# Patient Record
Sex: FEMALE | Race: WHITE | Employment: FULL TIME | URBAN - NONMETROPOLITAN AREA
[De-identification: names, ages, dates, MRNs, and addresses within clinical notes are randomized per-mention and may not be internally consistent; named-entity substitution may affect disease eponyms.]

---

## 2019-01-02 ENCOUNTER — APPOINTMENT (OUTPATIENT)
Dept: CT IMAGING | Age: 59
End: 2019-01-02
Payer: COMMERCIAL

## 2019-01-02 ENCOUNTER — HOSPITAL ENCOUNTER (EMERGENCY)
Age: 59
Discharge: ANOTHER HOSPITAL | End: 2019-01-02
Attending: EMERGENCY MEDICINE
Payer: COMMERCIAL

## 2019-01-02 ENCOUNTER — HOSPITAL ENCOUNTER (OUTPATIENT)
Age: 59
Setting detail: OBSERVATION
Discharge: HOME | End: 2019-01-06
Attending: HOSPITALIST | Admitting: HOSPITALIST
Payer: COMMERCIAL

## 2019-01-02 VITALS
DIASTOLIC BLOOD PRESSURE: 90 MMHG | BODY MASS INDEX: 30.61 KG/M2 | SYSTOLIC BLOOD PRESSURE: 153 MMHG | HEART RATE: 59 BPM | OXYGEN SATURATION: 98 % | RESPIRATION RATE: 15 BRPM | TEMPERATURE: 98.5 F | WEIGHT: 195 LBS | HEIGHT: 67 IN

## 2019-01-02 DIAGNOSIS — R26.9 IMPAIRED GAIT: ICD-10-CM

## 2019-01-02 DIAGNOSIS — R29.818 IMPAIRED PROPRIOCEPTION: Primary | ICD-10-CM

## 2019-01-02 DIAGNOSIS — R20.2 PARESTHESIAS: ICD-10-CM

## 2019-01-02 PROBLEM — R29.898 RIGHT LEG WEAKNESS: Status: ACTIVE | Noted: 2019-01-02

## 2019-01-02 PROBLEM — R27.0 ATAXIA: Status: ACTIVE | Noted: 2019-01-02

## 2019-01-02 LAB
ALBUMIN SERPL-MCNC: 4.2 GM/DL (ref 3.4–5)
ALP BLD-CCNC: 74 IU/L (ref 40–129)
ALT SERPL-CCNC: 41 U/L (ref 10–40)
ANION GAP SERPL CALCULATED.3IONS-SCNC: 10 MMOL/L (ref 4–16)
AST SERPL-CCNC: 29 IU/L (ref 15–37)
BASOPHILS ABSOLUTE: 0 K/CU MM
BASOPHILS RELATIVE PERCENT: 0.5 % (ref 0–1)
BILIRUB SERPL-MCNC: 0.4 MG/DL (ref 0–1)
BUN BLDV-MCNC: 14 MG/DL (ref 6–23)
CALCIUM SERPL-MCNC: 8.3 MG/DL (ref 8.3–10.6)
CHLORIDE BLD-SCNC: 102 MMOL/L (ref 99–110)
CO2: 29 MMOL/L (ref 21–32)
CREAT SERPL-MCNC: 0.7 MG/DL (ref 0.6–1.1)
DIFFERENTIAL TYPE: ABNORMAL
EOSINOPHILS ABSOLUTE: 0.1 K/CU MM
EOSINOPHILS RELATIVE PERCENT: 1.7 % (ref 0–3)
GFR AFRICAN AMERICAN: >60 ML/MIN/1.73M2
GFR NON-AFRICAN AMERICAN: >60 ML/MIN/1.73M2
GLUCOSE BLD-MCNC: 92 MG/DL (ref 70–99)
HCT VFR BLD CALC: 41.6 % (ref 37–47)
HEMOGLOBIN: 13.8 GM/DL (ref 12.5–16)
IMMATURE NEUTROPHIL %: 0.4 % (ref 0–0.43)
LYMPHOCYTES ABSOLUTE: 1.4 K/CU MM
LYMPHOCYTES RELATIVE PERCENT: 17.8 % (ref 24–44)
MCH RBC QN AUTO: 30.7 PG (ref 27–31)
MCHC RBC AUTO-ENTMCNC: 33.2 % (ref 32–36)
MCV RBC AUTO: 92.7 FL (ref 78–100)
MONOCYTES ABSOLUTE: 0.6 K/CU MM
MONOCYTES RELATIVE PERCENT: 7.9 % (ref 0–4)
PDW BLD-RTO: 12 % (ref 11.7–14.9)
PLATELET # BLD: 236 K/CU MM (ref 140–440)
PMV BLD AUTO: 10.5 FL (ref 7.5–11.1)
POTASSIUM SERPL-SCNC: 3.9 MMOL/L (ref 3.5–5.1)
RBC # BLD: 4.49 M/CU MM (ref 4.2–5.4)
SEGMENTED NEUTROPHILS ABSOLUTE COUNT: 5.6 K/CU MM
SEGMENTED NEUTROPHILS RELATIVE PERCENT: 71.7 % (ref 36–66)
SODIUM BLD-SCNC: 141 MMOL/L (ref 135–145)
TOTAL CK: 96 IU/L (ref 26–140)
TOTAL IMMATURE NEUTOROPHIL: 0.03 K/CU MM
TOTAL PROTEIN: 6.7 GM/DL (ref 6.4–8.2)
WBC # BLD: 7.8 K/CU MM (ref 4–10.5)

## 2019-01-02 PROCEDURE — 6370000000 HC RX 637 (ALT 250 FOR IP): Performed by: EMERGENCY MEDICINE

## 2019-01-02 PROCEDURE — G0378 HOSPITAL OBSERVATION PER HR: HCPCS

## 2019-01-02 PROCEDURE — 2580000003 HC RX 258: Performed by: NURSE PRACTITIONER

## 2019-01-02 PROCEDURE — 1200000000 HC SEMI PRIVATE

## 2019-01-02 PROCEDURE — 99285 EMERGENCY DEPT VISIT HI MDM: CPT

## 2019-01-02 PROCEDURE — 82550 ASSAY OF CK (CPK): CPT

## 2019-01-02 PROCEDURE — 70450 CT HEAD/BRAIN W/O DYE: CPT

## 2019-01-02 PROCEDURE — 80053 COMPREHEN METABOLIC PANEL: CPT

## 2019-01-02 PROCEDURE — 6370000000 HC RX 637 (ALT 250 FOR IP): Performed by: HOSPITALIST

## 2019-01-02 PROCEDURE — 85025 COMPLETE CBC W/AUTO DIFF WBC: CPT

## 2019-01-02 PROCEDURE — 6370000000 HC RX 637 (ALT 250 FOR IP): Performed by: NURSE PRACTITIONER

## 2019-01-02 RX ORDER — ATORVASTATIN CALCIUM 40 MG/1
40 TABLET, FILM COATED ORAL NIGHTLY
Status: DISCONTINUED | OUTPATIENT
Start: 2019-01-02 | End: 2019-01-06 | Stop reason: HOSPADM

## 2019-01-02 RX ORDER — FAMOTIDINE 20 MG/1
20 TABLET, FILM COATED ORAL 2 TIMES DAILY
Status: DISCONTINUED | OUTPATIENT
Start: 2019-01-02 | End: 2019-01-06 | Stop reason: HOSPADM

## 2019-01-02 RX ORDER — LISINOPRIL 40 MG/1
40 TABLET ORAL DAILY
Status: DISCONTINUED | OUTPATIENT
Start: 2019-01-03 | End: 2019-01-06 | Stop reason: HOSPADM

## 2019-01-02 RX ORDER — ONDANSETRON 2 MG/ML
4 INJECTION INTRAMUSCULAR; INTRAVENOUS EVERY 6 HOURS PRN
Status: DISCONTINUED | OUTPATIENT
Start: 2019-01-02 | End: 2019-01-06 | Stop reason: HOSPADM

## 2019-01-02 RX ORDER — ASPIRIN 81 MG/1
81 TABLET ORAL DAILY
Status: DISCONTINUED | OUTPATIENT
Start: 2019-01-03 | End: 2019-01-02

## 2019-01-02 RX ORDER — HYDROCHLOROTHIAZIDE 25 MG/1
25 TABLET ORAL DAILY
Status: DISCONTINUED | OUTPATIENT
Start: 2019-01-03 | End: 2019-01-06 | Stop reason: HOSPADM

## 2019-01-02 RX ORDER — SODIUM CHLORIDE 0.9 % (FLUSH) 0.9 %
10 SYRINGE (ML) INJECTION PRN
Status: DISCONTINUED | OUTPATIENT
Start: 2019-01-02 | End: 2019-01-06 | Stop reason: HOSPADM

## 2019-01-02 RX ORDER — SODIUM CHLORIDE 0.9 % (FLUSH) 0.9 %
10 SYRINGE (ML) INJECTION EVERY 12 HOURS SCHEDULED
Status: DISCONTINUED | OUTPATIENT
Start: 2019-01-02 | End: 2019-01-06 | Stop reason: HOSPADM

## 2019-01-02 RX ORDER — ESCITALOPRAM OXALATE 10 MG/1
10 TABLET ORAL DAILY
Status: DISCONTINUED | OUTPATIENT
Start: 2019-01-03 | End: 2019-01-06 | Stop reason: HOSPADM

## 2019-01-02 RX ORDER — ACETAMINOPHEN 500 MG
1000 TABLET ORAL ONCE
Status: COMPLETED | OUTPATIENT
Start: 2019-01-02 | End: 2019-01-02

## 2019-01-02 RX ORDER — ASPIRIN 81 MG/1
81 TABLET ORAL DAILY
Status: DISCONTINUED | OUTPATIENT
Start: 2019-01-02 | End: 2019-01-06 | Stop reason: HOSPADM

## 2019-01-02 RX ADMIN — SODIUM CHLORIDE, PRESERVATIVE FREE 10 ML: 5 INJECTION INTRAVENOUS at 23:42

## 2019-01-02 RX ADMIN — FAMOTIDINE 20 MG: 20 TABLET ORAL at 23:42

## 2019-01-02 RX ADMIN — ATORVASTATIN CALCIUM 40 MG: 40 TABLET, FILM COATED ORAL at 23:42

## 2019-01-02 RX ADMIN — ASPIRIN 81 MG: 81 TABLET, COATED ORAL at 23:42

## 2019-01-02 RX ADMIN — ACETAMINOPHEN 1000 MG: 500 TABLET, FILM COATED ORAL at 18:11

## 2019-01-02 ASSESSMENT — ENCOUNTER SYMPTOMS
RHINORRHEA: 0
ABDOMINAL PAIN: 0
VOMITING: 0
BACK PAIN: 0
COUGH: 0
SORE THROAT: 0
NAUSEA: 0
SHORTNESS OF BREATH: 0
EYE REDNESS: 0

## 2019-01-02 ASSESSMENT — PAIN SCALES - GENERAL
PAINLEVEL_OUTOF10: 6
PAINLEVEL_OUTOF10: 0

## 2019-01-03 ENCOUNTER — APPOINTMENT (OUTPATIENT)
Dept: ULTRASOUND IMAGING | Age: 59
End: 2019-01-03
Attending: HOSPITALIST
Payer: COMMERCIAL

## 2019-01-03 ENCOUNTER — APPOINTMENT (OUTPATIENT)
Dept: MRI IMAGING | Age: 59
End: 2019-01-03
Attending: HOSPITALIST
Payer: COMMERCIAL

## 2019-01-03 LAB
CHOLESTEROL: 260 MG/DL
HDLC SERPL-MCNC: 56 MG/DL
HOMOCYSTEINE: 10 UMOL/L (ref 0–10)
LDL CHOLESTEROL DIRECT: 204 MG/DL
LV EF: 55 %
LVEF MODALITY: NORMAL
TRIGL SERPL-MCNC: 100 MG/DL

## 2019-01-03 PROCEDURE — G8988 SELF CARE GOAL STATUS: HCPCS

## 2019-01-03 PROCEDURE — 36415 COLL VENOUS BLD VENIPUNCTURE: CPT

## 2019-01-03 PROCEDURE — 97116 GAIT TRAINING THERAPY: CPT

## 2019-01-03 PROCEDURE — 6360000002 HC RX W HCPCS: Performed by: NURSE PRACTITIONER

## 2019-01-03 PROCEDURE — 83721 ASSAY OF BLOOD LIPOPROTEIN: CPT

## 2019-01-03 PROCEDURE — 97166 OT EVAL MOD COMPLEX 45 MIN: CPT

## 2019-01-03 PROCEDURE — 97162 PT EVAL MOD COMPLEX 30 MIN: CPT

## 2019-01-03 PROCEDURE — 80061 LIPID PANEL: CPT

## 2019-01-03 PROCEDURE — G0378 HOSPITAL OBSERVATION PER HR: HCPCS

## 2019-01-03 PROCEDURE — 1200000000 HC SEMI PRIVATE

## 2019-01-03 PROCEDURE — 97530 THERAPEUTIC ACTIVITIES: CPT

## 2019-01-03 PROCEDURE — 83090 ASSAY OF HOMOCYSTEINE: CPT

## 2019-01-03 PROCEDURE — 2580000003 HC RX 258: Performed by: NURSE PRACTITIONER

## 2019-01-03 PROCEDURE — 93306 TTE W/DOPPLER COMPLETE: CPT

## 2019-01-03 PROCEDURE — 70551 MRI BRAIN STEM W/O DYE: CPT

## 2019-01-03 PROCEDURE — 93880 EXTRACRANIAL BILAT STUDY: CPT

## 2019-01-03 PROCEDURE — G8987 SELF CARE CURRENT STATUS: HCPCS

## 2019-01-03 PROCEDURE — 6370000000 HC RX 637 (ALT 250 FOR IP): Performed by: NURSE PRACTITIONER

## 2019-01-03 PROCEDURE — 6370000000 HC RX 637 (ALT 250 FOR IP): Performed by: HOSPITALIST

## 2019-01-03 RX ORDER — TRAMADOL HYDROCHLORIDE 50 MG/1
50 TABLET ORAL EVERY 6 HOURS PRN
Status: DISCONTINUED | OUTPATIENT
Start: 2019-01-03 | End: 2019-01-06 | Stop reason: HOSPADM

## 2019-01-03 RX ADMIN — TRAMADOL HYDROCHLORIDE 50 MG: 50 TABLET, FILM COATED ORAL at 16:10

## 2019-01-03 RX ADMIN — ATORVASTATIN CALCIUM 40 MG: 40 TABLET, FILM COATED ORAL at 20:38

## 2019-01-03 RX ADMIN — FAMOTIDINE 20 MG: 20 TABLET ORAL at 20:38

## 2019-01-03 RX ADMIN — HYDROCHLOROTHIAZIDE 25 MG: 25 TABLET ORAL at 11:07

## 2019-01-03 RX ADMIN — SODIUM CHLORIDE, PRESERVATIVE FREE 10 ML: 5 INJECTION INTRAVENOUS at 20:38

## 2019-01-03 RX ADMIN — ESCITALOPRAM OXALATE 10 MG: 10 TABLET ORAL at 11:07

## 2019-01-03 RX ADMIN — FAMOTIDINE 20 MG: 20 TABLET ORAL at 11:07

## 2019-01-03 RX ADMIN — SODIUM CHLORIDE, PRESERVATIVE FREE 10 ML: 5 INJECTION INTRAVENOUS at 11:08

## 2019-01-03 RX ADMIN — LISINOPRIL 40 MG: 40 TABLET ORAL at 11:07

## 2019-01-03 RX ADMIN — ENOXAPARIN SODIUM 40 MG: 40 INJECTION SUBCUTANEOUS at 11:07

## 2019-01-03 RX ADMIN — ASPIRIN 81 MG: 81 TABLET, COATED ORAL at 11:07

## 2019-01-03 ASSESSMENT — PAIN SCALES - GENERAL
PAINLEVEL_OUTOF10: 7
PAINLEVEL_OUTOF10: 5
PAINLEVEL_OUTOF10: 5
PAINLEVEL_OUTOF10: 0
PAINLEVEL_OUTOF10: 4
PAINLEVEL_OUTOF10: 3

## 2019-01-03 ASSESSMENT — PAIN DESCRIPTION - LOCATION
LOCATION: FLANK
LOCATION: FLANK

## 2019-01-03 ASSESSMENT — PAIN DESCRIPTION - ONSET: ONSET: UNABLE TO TELL

## 2019-01-03 ASSESSMENT — PAIN DESCRIPTION - DESCRIPTORS: DESCRIPTORS: BURNING;TENDER;SORE

## 2019-01-03 ASSESSMENT — PAIN DESCRIPTION - FREQUENCY: FREQUENCY: INTERMITTENT

## 2019-01-03 ASSESSMENT — PAIN DESCRIPTION - ORIENTATION
ORIENTATION: RIGHT
ORIENTATION: RIGHT

## 2019-01-03 ASSESSMENT — PAIN DESCRIPTION - PAIN TYPE
TYPE: ACUTE PAIN
TYPE: ACUTE PAIN

## 2019-01-04 PROCEDURE — 6370000000 HC RX 637 (ALT 250 FOR IP): Performed by: HOSPITALIST

## 2019-01-04 PROCEDURE — 6370000000 HC RX 637 (ALT 250 FOR IP): Performed by: NURSE PRACTITIONER

## 2019-01-04 PROCEDURE — G0378 HOSPITAL OBSERVATION PER HR: HCPCS

## 2019-01-04 PROCEDURE — 6360000002 HC RX W HCPCS: Performed by: NURSE PRACTITIONER

## 2019-01-04 PROCEDURE — 96374 THER/PROPH/DIAG INJ IV PUSH: CPT

## 2019-01-04 PROCEDURE — 6360000002 HC RX W HCPCS: Performed by: HOSPITALIST

## 2019-01-04 PROCEDURE — 2580000003 HC RX 258: Performed by: NURSE PRACTITIONER

## 2019-01-04 PROCEDURE — 97116 GAIT TRAINING THERAPY: CPT

## 2019-01-04 RX ORDER — KETOROLAC TROMETHAMINE 30 MG/ML
15 INJECTION, SOLUTION INTRAMUSCULAR; INTRAVENOUS ONCE
Status: COMPLETED | OUTPATIENT
Start: 2019-01-04 | End: 2019-01-04

## 2019-01-04 RX ADMIN — FAMOTIDINE 20 MG: 20 TABLET ORAL at 21:56

## 2019-01-04 RX ADMIN — ENOXAPARIN SODIUM 40 MG: 40 INJECTION SUBCUTANEOUS at 09:07

## 2019-01-04 RX ADMIN — SODIUM CHLORIDE, PRESERVATIVE FREE 10 ML: 5 INJECTION INTRAVENOUS at 21:56

## 2019-01-04 RX ADMIN — KETOROLAC TROMETHAMINE 15 MG: 30 INJECTION, SOLUTION INTRAMUSCULAR at 11:43

## 2019-01-04 RX ADMIN — LISINOPRIL 40 MG: 40 TABLET ORAL at 09:07

## 2019-01-04 RX ADMIN — ASPIRIN 81 MG: 81 TABLET, COATED ORAL at 09:07

## 2019-01-04 RX ADMIN — SODIUM CHLORIDE, PRESERVATIVE FREE 10 ML: 5 INJECTION INTRAVENOUS at 09:09

## 2019-01-04 RX ADMIN — FAMOTIDINE 20 MG: 20 TABLET ORAL at 09:07

## 2019-01-04 RX ADMIN — ESCITALOPRAM OXALATE 10 MG: 10 TABLET ORAL at 09:07

## 2019-01-04 RX ADMIN — HYDROCHLOROTHIAZIDE 25 MG: 25 TABLET ORAL at 09:07

## 2019-01-04 RX ADMIN — ATORVASTATIN CALCIUM 40 MG: 40 TABLET, FILM COATED ORAL at 21:56

## 2019-01-04 RX ADMIN — TRAMADOL HYDROCHLORIDE 50 MG: 50 TABLET, FILM COATED ORAL at 22:40

## 2019-01-04 ASSESSMENT — PAIN DESCRIPTION - PAIN TYPE: TYPE: ACUTE PAIN

## 2019-01-04 ASSESSMENT — PAIN SCALES - GENERAL
PAINLEVEL_OUTOF10: 5
PAINLEVEL_OUTOF10: 6

## 2019-01-04 ASSESSMENT — PAIN DESCRIPTION - PROGRESSION: CLINICAL_PROGRESSION: NOT CHANGED

## 2019-01-04 ASSESSMENT — PAIN DESCRIPTION - ORIENTATION: ORIENTATION: RIGHT

## 2019-01-04 ASSESSMENT — PAIN DESCRIPTION - FREQUENCY: FREQUENCY: INTERMITTENT

## 2019-01-04 ASSESSMENT — PAIN DESCRIPTION - DESCRIPTORS: DESCRIPTORS: BURNING;TENDER;SORE

## 2019-01-04 ASSESSMENT — PAIN DESCRIPTION - LOCATION: LOCATION: FLANK

## 2019-01-04 ASSESSMENT — PAIN DESCRIPTION - ONSET: ONSET: ON-GOING

## 2019-01-05 ENCOUNTER — APPOINTMENT (OUTPATIENT)
Dept: MRI IMAGING | Age: 59
End: 2019-01-05
Attending: HOSPITALIST
Payer: COMMERCIAL

## 2019-01-05 LAB
FOLATE: 14.2 NG/ML (ref 3.1–17.5)
TSH HIGH SENSITIVITY: 1.8 UIU/ML (ref 0.27–4.2)
VITAMIN B-12: 493.4 PG/ML (ref 211–911)

## 2019-01-05 PROCEDURE — 6370000000 HC RX 637 (ALT 250 FOR IP): Performed by: PSYCHIATRY & NEUROLOGY

## 2019-01-05 PROCEDURE — 72141 MRI NECK SPINE W/O DYE: CPT

## 2019-01-05 PROCEDURE — G0378 HOSPITAL OBSERVATION PER HR: HCPCS

## 2019-01-05 PROCEDURE — 84443 ASSAY THYROID STIM HORMONE: CPT

## 2019-01-05 PROCEDURE — 72146 MRI CHEST SPINE W/O DYE: CPT

## 2019-01-05 PROCEDURE — 6360000002 HC RX W HCPCS: Performed by: NURSE PRACTITIONER

## 2019-01-05 PROCEDURE — 86038 ANTINUCLEAR ANTIBODIES: CPT

## 2019-01-05 PROCEDURE — 97116 GAIT TRAINING THERAPY: CPT

## 2019-01-05 PROCEDURE — 6370000000 HC RX 637 (ALT 250 FOR IP): Performed by: HOSPITALIST

## 2019-01-05 PROCEDURE — 36415 COLL VENOUS BLD VENIPUNCTURE: CPT

## 2019-01-05 PROCEDURE — 2580000003 HC RX 258: Performed by: NURSE PRACTITIONER

## 2019-01-05 PROCEDURE — 97530 THERAPEUTIC ACTIVITIES: CPT

## 2019-01-05 PROCEDURE — 97110 THERAPEUTIC EXERCISES: CPT

## 2019-01-05 PROCEDURE — 82607 VITAMIN B-12: CPT

## 2019-01-05 PROCEDURE — 72148 MRI LUMBAR SPINE W/O DYE: CPT

## 2019-01-05 PROCEDURE — 82746 ASSAY OF FOLIC ACID SERUM: CPT

## 2019-01-05 PROCEDURE — 6370000000 HC RX 637 (ALT 250 FOR IP): Performed by: NURSE PRACTITIONER

## 2019-01-05 RX ORDER — GABAPENTIN 300 MG/1
300 CAPSULE ORAL 2 TIMES DAILY
Status: DISCONTINUED | OUTPATIENT
Start: 2019-01-05 | End: 2019-01-06 | Stop reason: HOSPADM

## 2019-01-05 RX ORDER — B12/LEVOMEFOLATE CALCIUM/B-6 2-1.13-25
1 TABLET ORAL DAILY
Status: DISCONTINUED | OUTPATIENT
Start: 2019-01-06 | End: 2019-01-06 | Stop reason: HOSPADM

## 2019-01-05 RX ADMIN — ESCITALOPRAM OXALATE 10 MG: 10 TABLET ORAL at 08:37

## 2019-01-05 RX ADMIN — FAMOTIDINE 20 MG: 20 TABLET ORAL at 21:02

## 2019-01-05 RX ADMIN — ENOXAPARIN SODIUM 40 MG: 40 INJECTION SUBCUTANEOUS at 08:38

## 2019-01-05 RX ADMIN — SODIUM CHLORIDE, PRESERVATIVE FREE 10 ML: 5 INJECTION INTRAVENOUS at 21:02

## 2019-01-05 RX ADMIN — HYDROCHLOROTHIAZIDE 25 MG: 25 TABLET ORAL at 08:38

## 2019-01-05 RX ADMIN — TRAMADOL HYDROCHLORIDE 50 MG: 50 TABLET, FILM COATED ORAL at 21:05

## 2019-01-05 RX ADMIN — GABAPENTIN 300 MG: 300 CAPSULE ORAL at 22:34

## 2019-01-05 RX ADMIN — SODIUM CHLORIDE, PRESERVATIVE FREE 10 ML: 5 INJECTION INTRAVENOUS at 08:38

## 2019-01-05 RX ADMIN — FAMOTIDINE 20 MG: 20 TABLET ORAL at 08:37

## 2019-01-05 RX ADMIN — ASPIRIN 81 MG: 81 TABLET, COATED ORAL at 08:37

## 2019-01-05 RX ADMIN — LISINOPRIL 40 MG: 40 TABLET ORAL at 08:38

## 2019-01-05 RX ADMIN — ATORVASTATIN CALCIUM 40 MG: 40 TABLET, FILM COATED ORAL at 21:02

## 2019-01-05 ASSESSMENT — PAIN DESCRIPTION - FREQUENCY: FREQUENCY: INTERMITTENT

## 2019-01-05 ASSESSMENT — PAIN DESCRIPTION - LOCATION
LOCATION: ABDOMEN
LOCATION: FLANK

## 2019-01-05 ASSESSMENT — PAIN SCALES - GENERAL
PAINLEVEL_OUTOF10: 6
PAINLEVEL_OUTOF10: 2
PAINLEVEL_OUTOF10: 6
PAINLEVEL_OUTOF10: 4

## 2019-01-05 ASSESSMENT — PAIN DESCRIPTION - PROGRESSION
CLINICAL_PROGRESSION: NOT CHANGED

## 2019-01-05 ASSESSMENT — PAIN DESCRIPTION - ONSET: ONSET: ON-GOING

## 2019-01-05 ASSESSMENT — PAIN DESCRIPTION - DESCRIPTORS: DESCRIPTORS: ACHING

## 2019-01-05 ASSESSMENT — PAIN DESCRIPTION - PAIN TYPE
TYPE: ACUTE PAIN
TYPE: ACUTE PAIN

## 2019-01-05 ASSESSMENT — PAIN DESCRIPTION - ORIENTATION
ORIENTATION: RIGHT
ORIENTATION: RIGHT

## 2019-01-06 ENCOUNTER — APPOINTMENT (OUTPATIENT)
Dept: MRI IMAGING | Age: 59
End: 2019-01-06
Attending: HOSPITALIST
Payer: COMMERCIAL

## 2019-01-06 VITALS
BODY MASS INDEX: 30.92 KG/M2 | OXYGEN SATURATION: 96 % | HEART RATE: 69 BPM | SYSTOLIC BLOOD PRESSURE: 109 MMHG | TEMPERATURE: 97.9 F | WEIGHT: 197 LBS | DIASTOLIC BLOOD PRESSURE: 61 MMHG | RESPIRATION RATE: 16 BRPM | HEIGHT: 67 IN

## 2019-01-06 LAB — ANTI-NUCLEAR ANTIBODY (ANA): NORMAL

## 2019-01-06 PROCEDURE — 6370000000 HC RX 637 (ALT 250 FOR IP): Performed by: PSYCHIATRY & NEUROLOGY

## 2019-01-06 PROCEDURE — 2580000003 HC RX 258: Performed by: NURSE PRACTITIONER

## 2019-01-06 PROCEDURE — 6360000004 HC RX CONTRAST MEDICATION: Performed by: PSYCHIATRY & NEUROLOGY

## 2019-01-06 PROCEDURE — 94761 N-INVAS EAR/PLS OXIMETRY MLT: CPT

## 2019-01-06 PROCEDURE — G0378 HOSPITAL OBSERVATION PER HR: HCPCS

## 2019-01-06 PROCEDURE — 72142 MRI NECK SPINE W/DYE: CPT

## 2019-01-06 PROCEDURE — 6370000000 HC RX 637 (ALT 250 FOR IP): Performed by: NURSE PRACTITIONER

## 2019-01-06 PROCEDURE — 6370000000 HC RX 637 (ALT 250 FOR IP): Performed by: HOSPITALIST

## 2019-01-06 PROCEDURE — A9579 GAD-BASE MR CONTRAST NOS,1ML: HCPCS | Performed by: PSYCHIATRY & NEUROLOGY

## 2019-01-06 PROCEDURE — 6360000002 HC RX W HCPCS: Performed by: NURSE PRACTITIONER

## 2019-01-06 RX ORDER — ASPIRIN 81 MG/1
81 TABLET ORAL DAILY
Qty: 30 TABLET | Refills: 0 | Status: SHIPPED | OUTPATIENT
Start: 2019-01-07

## 2019-01-06 RX ORDER — ATORVASTATIN CALCIUM 40 MG/1
40 TABLET, FILM COATED ORAL NIGHTLY
Qty: 30 TABLET | Refills: 0 | Status: SHIPPED | OUTPATIENT
Start: 2019-01-06 | End: 2021-09-22

## 2019-01-06 RX ORDER — FAMOTIDINE 20 MG/1
20 TABLET, FILM COATED ORAL 2 TIMES DAILY
Qty: 60 TABLET | Refills: 0 | Status: SHIPPED | OUTPATIENT
Start: 2019-01-06 | End: 2021-09-22 | Stop reason: SDUPTHER

## 2019-01-06 RX ORDER — GABAPENTIN 300 MG/1
300 CAPSULE ORAL 2 TIMES DAILY
Qty: 60 CAPSULE | Refills: 0 | Status: SHIPPED | OUTPATIENT
Start: 2019-01-06 | End: 2021-05-26

## 2019-01-06 RX ORDER — B12/LEVOMEFOLATE CALCIUM/B-6 2-1.13-25
1 TABLET ORAL DAILY
Qty: 30 TABLET | Refills: 0 | Status: SHIPPED | OUTPATIENT
Start: 2019-01-07 | End: 2019-05-24

## 2019-01-06 RX ADMIN — Medication 1 TABLET: at 08:24

## 2019-01-06 RX ADMIN — ASPIRIN 81 MG: 81 TABLET, COATED ORAL at 08:24

## 2019-01-06 RX ADMIN — GABAPENTIN 300 MG: 300 CAPSULE ORAL at 08:24

## 2019-01-06 RX ADMIN — GADOTERIDOL 20 ML: 279.3 INJECTION, SOLUTION INTRAVENOUS at 07:43

## 2019-01-06 RX ADMIN — FAMOTIDINE 20 MG: 20 TABLET ORAL at 08:24

## 2019-01-06 RX ADMIN — ENOXAPARIN SODIUM 40 MG: 40 INJECTION SUBCUTANEOUS at 08:25

## 2019-01-06 RX ADMIN — ESCITALOPRAM OXALATE 10 MG: 10 TABLET ORAL at 08:24

## 2019-01-06 RX ADMIN — SODIUM CHLORIDE, PRESERVATIVE FREE 10 ML: 5 INJECTION INTRAVENOUS at 08:26

## 2019-01-06 ASSESSMENT — PAIN DESCRIPTION - PROGRESSION
CLINICAL_PROGRESSION: NOT CHANGED

## 2019-01-06 ASSESSMENT — PAIN DESCRIPTION - FREQUENCY: FREQUENCY: INTERMITTENT

## 2019-01-06 ASSESSMENT — PAIN DESCRIPTION - ONSET: ONSET: ON-GOING

## 2019-01-06 ASSESSMENT — PAIN DESCRIPTION - ORIENTATION: ORIENTATION: RIGHT

## 2019-01-06 ASSESSMENT — PAIN DESCRIPTION - PAIN TYPE: TYPE: ACUTE PAIN

## 2019-01-06 ASSESSMENT — PAIN DESCRIPTION - LOCATION: LOCATION: FLANK

## 2019-01-06 ASSESSMENT — PAIN SCALES - GENERAL: PAINLEVEL_OUTOF10: 4

## 2019-01-06 ASSESSMENT — PAIN DESCRIPTION - DESCRIPTORS: DESCRIPTORS: ACHING

## 2019-01-29 ENCOUNTER — HOSPITAL ENCOUNTER (OUTPATIENT)
Dept: GENERAL RADIOLOGY | Age: 59
Discharge: HOME | End: 2019-01-29
Payer: COMMERCIAL

## 2019-01-29 VITALS
TEMPERATURE: 97.9 F | HEART RATE: 61 BPM | RESPIRATION RATE: 16 BRPM | OXYGEN SATURATION: 99 % | DIASTOLIC BLOOD PRESSURE: 84 MMHG | SYSTOLIC BLOOD PRESSURE: 124 MMHG

## 2019-01-29 DIAGNOSIS — G35 MULTIPLE SCLEROSIS (HCC): ICD-10-CM

## 2019-01-29 LAB
APTT: 24.9 SECONDS (ref 21.2–33)
CRYPTOCOCCAL ANTIGEN CSF: NORMAL
CRYPTOCOCCAL ANTIGEN CSF: NORMAL
GLUCOSE, CSF: 65 MG/DL (ref 40–75)
HCT VFR BLD CALC: 43.1 % (ref 37–47)
HEMOGLOBIN: 14.1 GM/DL (ref 12.5–16)
INR BLD: 1.02 INDEX
LYMPHS CSF: 84 %
LYMPHS CSF: 87 %
MCH RBC QN AUTO: 31.1 PG (ref 27–31)
MCHC RBC AUTO-ENTMCNC: 32.7 % (ref 32–36)
MCV RBC AUTO: 94.9 FL (ref 78–100)
OTHER CELLS CSF: ABNORMAL CU MM
OTHER CELLS CSF: ABNORMAL CU MM
OTHER CELLS, CSF: ABNORMAL %
OTHER CELLS, CSF: ABNORMAL %
PDW BLD-RTO: 12.1 % (ref 11.7–14.9)
PLATELET # BLD: 216 K/CU MM (ref 140–440)
PMV BLD AUTO: 10.2 FL (ref 7.5–11.1)
PROTEIN CSF: 36 MG/DL (ref 15–45)
PROTHROMBIN TIME: 11.8 SECONDS (ref 9.12–12.5)
RBC # BLD: 4.54 M/CU MM (ref 4.2–5.4)
RBC CSF: 1 CU MM
RBC CSF: 4 CU MM
WBC # BLD: 6 K/CU MM (ref 4–10.5)
WBC CSF: 1 CU MM (ref 0–5)
WBC CSF: 2 CU MM (ref 0–5)

## 2019-01-29 PROCEDURE — 85027 COMPLETE CBC AUTOMATED: CPT

## 2019-01-29 PROCEDURE — 83916 OLIGOCLONAL BANDS: CPT

## 2019-01-29 PROCEDURE — 85610 PROTHROMBIN TIME: CPT

## 2019-01-29 PROCEDURE — 87327 CRYPTOCOCCUS NEOFORM AG IA: CPT

## 2019-01-29 PROCEDURE — 62270 DX LMBR SPI PNXR: CPT

## 2019-01-29 PROCEDURE — 87071 CULTURE AEROBIC QUANT OTHER: CPT

## 2019-01-29 PROCEDURE — 82945 GLUCOSE OTHER FLUID: CPT

## 2019-01-29 PROCEDURE — 87073 CULTURE BACTERIA ANAEROBIC: CPT

## 2019-01-29 PROCEDURE — 7100000010 HC PHASE II RECOVERY - FIRST 15 MIN

## 2019-01-29 PROCEDURE — 87205 SMEAR GRAM STAIN: CPT

## 2019-01-29 PROCEDURE — 85730 THROMBOPLASTIN TIME PARTIAL: CPT

## 2019-01-29 PROCEDURE — 84157 ASSAY OF PROTEIN OTHER: CPT

## 2019-01-29 PROCEDURE — 86592 SYPHILIS TEST NON-TREP QUAL: CPT

## 2019-01-29 PROCEDURE — 83873 ASSAY OF CSF PROTEIN: CPT

## 2019-01-29 PROCEDURE — 89051 BODY FLUID CELL COUNT: CPT

## 2019-01-29 PROCEDURE — 7100000011 HC PHASE II RECOVERY - ADDTL 15 MIN

## 2019-01-29 ASSESSMENT — PAIN SCALES - GENERAL
PAINLEVEL_OUTOF10: 0
PAINLEVEL_OUTOF10: 3

## 2019-01-29 ASSESSMENT — PAIN - FUNCTIONAL ASSESSMENT: PAIN_FUNCTIONAL_ASSESSMENT: 0-10

## 2019-01-29 ASSESSMENT — PAIN DESCRIPTION - LOCATION: LOCATION: HEAD

## 2019-01-29 ASSESSMENT — PAIN DESCRIPTION - DESCRIPTORS: DESCRIPTORS: ACHING

## 2019-01-29 ASSESSMENT — PAIN DESCRIPTION - PAIN TYPE: TYPE: ACUTE PAIN

## 2019-02-01 LAB
ALBUMIN CSF: 22 (ref 13.9–24.7)
ALBUMIN INDEX: 5
ALBUMIN SERPL-MCNC: 4360 G/DL (ref 3500–5200)
CSF IGG INTERPRETATION: 0.08
CULTURE: NORMAL
GRAM SMEAR: NORMAL
IGG CSF: 1.7 (ref 0–6)
IGG INDEX: 0.55 (ref 0.2–0.7)
IGG SYNTHESIS RATE CSF: <0
IGG: 609 (ref 700–1600)
INTERP/SPECIAL CHEMISTRY: ABNORMAL
Lab: NORMAL
MYELIN BASIC PROTEIN, CSF: 4.53
OLIGOCLONAL BANDS CSF: POSITIVE
OLIGOCOLONAL BANDS SERUM: 4
REPORT STATUS: NORMAL
SPECIMEN: NORMAL
VDRL CSF SCREEN: NON REACTIVE

## 2019-02-13 ENCOUNTER — HOSPITAL ENCOUNTER (OUTPATIENT)
Dept: MRI IMAGING | Age: 59
Discharge: HOME | End: 2019-02-13
Payer: COMMERCIAL

## 2019-02-13 DIAGNOSIS — R51.9 INTRACTABLE EPISODIC HEADACHE, UNSPECIFIED HEADACHE TYPE: ICD-10-CM

## 2019-02-13 DIAGNOSIS — M48.07 LUMBOSACRAL SPINAL STENOSIS: ICD-10-CM

## 2019-02-13 DIAGNOSIS — M54.6 PAIN IN THORACIC SPINE: ICD-10-CM

## 2019-02-13 PROCEDURE — A9579 GAD-BASE MR CONTRAST NOS,1ML: HCPCS | Performed by: PSYCHIATRY & NEUROLOGY

## 2019-02-13 PROCEDURE — 70553 MRI BRAIN STEM W/O & W/DYE: CPT

## 2019-02-13 PROCEDURE — 72158 MRI LUMBAR SPINE W/O & W/DYE: CPT

## 2019-02-13 PROCEDURE — 6360000004 HC RX CONTRAST MEDICATION: Performed by: PSYCHIATRY & NEUROLOGY

## 2019-02-13 PROCEDURE — 72157 MRI CHEST SPINE W/O & W/DYE: CPT

## 2019-02-13 RX ADMIN — GADOTERIDOL 18 ML: 279.3 INJECTION, SOLUTION INTRAVENOUS at 16:32

## 2019-05-24 ENCOUNTER — HOSPITAL ENCOUNTER (EMERGENCY)
Age: 59
Discharge: HOME | End: 2019-05-24
Attending: EMERGENCY MEDICINE
Payer: COMMERCIAL

## 2019-05-24 VITALS
WEIGHT: 180 LBS | SYSTOLIC BLOOD PRESSURE: 146 MMHG | HEART RATE: 72 BPM | BODY MASS INDEX: 28.25 KG/M2 | DIASTOLIC BLOOD PRESSURE: 100 MMHG | RESPIRATION RATE: 16 BRPM | TEMPERATURE: 98.4 F | OXYGEN SATURATION: 95 % | HEIGHT: 67 IN

## 2019-05-24 DIAGNOSIS — S61.219A LACERATION OF FINGER OF LEFT HAND WITHOUT FOREIGN BODY WITHOUT DAMAGE TO NAIL, UNSPECIFIED FINGER, INITIAL ENCOUNTER: Primary | ICD-10-CM

## 2019-05-24 PROCEDURE — 99282 EMERGENCY DEPT VISIT SF MDM: CPT

## 2019-05-24 PROCEDURE — 6370000000 HC RX 637 (ALT 250 FOR IP): Performed by: EMERGENCY MEDICINE

## 2019-05-24 PROCEDURE — 4500000027

## 2019-05-24 RX ADMIN — Medication 1 EACH: at 12:50

## 2019-05-24 ASSESSMENT — PAIN DESCRIPTION - LOCATION: LOCATION: HAND

## 2019-05-24 ASSESSMENT — PAIN DESCRIPTION - DESCRIPTORS: DESCRIPTORS: BURNING

## 2019-05-24 ASSESSMENT — PAIN DESCRIPTION - ORIENTATION: ORIENTATION: LEFT

## 2019-05-24 ASSESSMENT — PAIN DESCRIPTION - PAIN TYPE: TYPE: ACUTE PAIN

## 2019-05-24 ASSESSMENT — PAIN DESCRIPTION - ONSET: ONSET: SUDDEN

## 2019-05-24 ASSESSMENT — PAIN SCALES - GENERAL: PAINLEVEL_OUTOF10: 3

## 2019-05-24 ASSESSMENT — PAIN DESCRIPTION - PROGRESSION: CLINICAL_PROGRESSION: NOT CHANGED

## 2019-05-24 NOTE — ED PROVIDER NOTES
Triage Chief Complaint:   Laceration (cut hand on jennifer jar that had a crack in it while washing dishes, small lac between ring and pinky finger left)    Cahto:  Nery Vasquez is a 60 y.o. female that presents With a small laceration between her  Pinky finger and ring finger on the left hand. She did it while washing dishes on a broken glass chart. Tetanus is up-to-date. ROS:  General:  No fevers  Musculoskeletal:  No muscle pain, no joint pain  Skin:  + laceration  Neurologic:   no extremity numbness, no extremity tingling, no extremity weakness  Extremities:  no edema, no pain    Past Medical History:   Diagnosis Date    Acid reflux     Bilateral renal cysts     Depression     History of HPV infection     per old chart    Hx of fall 12/31/2018    Hyperlipidemia     Hypertension     Weakness of right leg     per old chart pt in ER with c/o weakness right leg, feeling\"needles/pins sensation right hip to right foot\" consult with Dr Crystal Berrios     Past Surgical History:   Procedure Laterality Date    BACK SURGERY  1992    lumbar     CERVIX BIOPSY  04/2016    cone bx     COLONOSCOPY  last one 2013?     COLPOSCOPY  03/2016    TUBAL LIGATION  1990     Family History   Problem Relation Age of Onset    Diabetes Mother     High Blood Pressure Mother     Heart Disease Father      Social History     Socioeconomic History    Marital status:      Spouse name: Not on file    Number of children: Not on file    Years of education: Not on file    Highest education level: Not on file   Occupational History    Not on file   Social Needs    Financial resource strain: Not on file    Food insecurity:     Worry: Not on file     Inability: Not on file    Transportation needs:     Medical: Not on file     Non-medical: Not on file   Tobacco Use    Smoking status: Former Smoker     Packs/day: 0.50     Years: 2.00     Pack years: 1.00    Smokeless tobacco: Never Used    Tobacco comment: last smoked 40 yrs ago    Substance and Sexual Activity    Alcohol use: Yes     Alcohol/week: 0.6 oz     Types: 1 Glasses of wine per week     Comment: weekly/ per pt on 1/28/2019\"2-3 times per week\"    Drug use: No    Sexual activity: Yes     Partners: Male   Lifestyle    Physical activity:     Days per week: Not on file     Minutes per session: Not on file    Stress: Not on file   Relationships    Social connections:     Talks on phone: Not on file     Gets together: Not on file     Attends Spiritism service: Not on file     Active member of club or organization: Not on file     Attends meetings of clubs or organizations: Not on file     Relationship status: Not on file    Intimate partner violence:     Fear of current or ex partner: Not on file     Emotionally abused: Not on file     Physically abused: Not on file     Forced sexual activity: Not on file   Other Topics Concern    Not on file   Social History Narrative    Not on file     No current facility-administered medications for this encounter. Current Outpatient Medications   Medication Sig Dispense Refill    aspirin 81 MG EC tablet Take 1 tablet by mouth daily 30 tablet 0    gabapentin (NEURONTIN) 300 MG capsule Take 1 capsule by mouth 2 times daily for 30 days. . (Patient taking differently: Take 300 mg by mouth 2 times daily.  Indications: 600 mg at bedtime ) 60 capsule 0    atorvastatin (LIPITOR) 40 MG tablet Take 1 tablet by mouth nightly 30 tablet 0    famotidine (PEPCID) 20 MG tablet Take 1 tablet by mouth 2 times daily 60 tablet 0    hydrochlorothiazide (HYDRODIURIL) 25 MG tablet Take 25 mg by mouth daily      escitalopram (LEXAPRO) 10 MG tablet Take 10 mg by mouth daily      fosinopril (MONOPRIL) 10 MG tablet Take 40 mg by mouth daily        No Known Allergies    Nursing Notes Reviewed    Physical Exam:  ED Triage Vitals [05/24/19 1218]   Enc Vitals Group      BP (!) 146/100      Pulse 72      Resp 16      Temp 98.4 °F (36.9 °C)      Temp Source Oral      SpO2 95 %      Weight 180 lb (81.6 kg)      Height 5' 7\" (1.702 m)      Head Circumference       Peak Flow       Pain Score       Pain Loc       Pain Edu? Excl. in [address]? General appearance:  No acute distress. Skin:  Warm. Dry. 1.5 cm laceration noted in the dorsal web space between the left fourth and fifth digit, no active bleeding, fairly good skin approximation  Ears, nose, mouth and throat:  Oral mucosa moist   Extremity:  No swelling. Normal ROM     Perfusion:  intact          Neurological:  Alert and oriented times 3. Motor and sensory exam intact to affected extremity    I have reviewed and interpreted all of the currently available lab results from this visit (if applicable):  No results found for this visit on 05/24/19. Radiographs (if obtained):  [] The following radiograph was interpreted by myself in the absence of a radiologist:   [] Radiologist's Report Reviewed:  No orders to display       MDM:  Patient with a laceration, it was repaired using Dermabond, no complications, wound care instructions given she will be discharged to follow-up return warnings given. Procedure note: Laceration repair  Patient's wound was cleansed, dried, nursing staff did hold skin approximation I applied some Dermabond, once dried replaced gauze between her fourth and fifth digit and then jose taped the 2 fingers together, patient tolerated the procedure well and there were no couple occasions    Clinical Impression:  1. Laceration of finger of left hand without foreign body without damage to nail, unspecified finger, initial encounter      Disposition referral (if applicable):   EUGENIO Lares - CNP  [address] [address]  [phone]    In 1 week      Disposition medications (if applicable):  New Prescriptions    No medications on file       Comment: Please note this report has been produced using speech recognition software and may contain errors related to that system including errors in grammar, punctuation, and spelling, as well as words and phrases that may be inappropriate. If there are any questions or concerns please feel free to contact the dictating provider for clarification.         Nick Simons MD  05/24/19 4425

## 2020-02-18 ASSESSMENT — PAIN DESCRIPTION - PAIN TYPE: TYPE: ACUTE PAIN

## 2020-02-18 ASSESSMENT — PAIN SCALES - GENERAL: PAINLEVEL_OUTOF10: 6

## 2020-02-18 ASSESSMENT — PAIN DESCRIPTION - FREQUENCY: FREQUENCY: INTERMITTENT

## 2020-02-18 ASSESSMENT — PAIN DESCRIPTION - ONSET: ONSET: ON-GOING

## 2020-02-19 ENCOUNTER — HOSPITAL ENCOUNTER (EMERGENCY)
Age: 60
Discharge: HOME | End: 2020-02-19
Attending: EMERGENCY MEDICINE
Payer: COMMERCIAL

## 2020-02-19 VITALS
BODY MASS INDEX: 28.25 KG/M2 | HEART RATE: 70 BPM | TEMPERATURE: 98.5 F | HEIGHT: 67 IN | WEIGHT: 180 LBS | DIASTOLIC BLOOD PRESSURE: 94 MMHG | OXYGEN SATURATION: 97 % | RESPIRATION RATE: 18 BRPM | SYSTOLIC BLOOD PRESSURE: 141 MMHG

## 2020-02-19 PROCEDURE — 99282 EMERGENCY DEPT VISIT SF MDM: CPT

## 2020-02-19 PROCEDURE — 6370000000 HC RX 637 (ALT 250 FOR IP): Performed by: EMERGENCY MEDICINE

## 2020-02-19 RX ORDER — DOXYCYCLINE HYCLATE 100 MG
100 TABLET ORAL ONCE
Status: COMPLETED | OUTPATIENT
Start: 2020-02-19 | End: 2020-02-19

## 2020-02-19 RX ORDER — DOXYCYCLINE HYCLATE 100 MG
100 TABLET ORAL 2 TIMES DAILY
Qty: 14 TABLET | Refills: 0 | Status: SHIPPED | OUTPATIENT
Start: 2020-02-19 | End: 2020-02-26

## 2020-02-19 RX ADMIN — DOXYCYCLINE HYCLATE 100 MG: 100 TABLET ORAL at 01:34

## 2020-02-19 NOTE — ED NOTES
Bed: ED-19  Expected date:   Expected time:   Means of arrival:   Comments:  triage     Dorine Severance, RN  02/19/20 0106

## 2020-02-19 NOTE — ED TRIAGE NOTES
Pt states she cut her left thumb a week ago. not seen then she bumped it two days ago and not has redness to wrist area and states she has under arm pain and into neck area. Was at urgent care today and they said they could not treat her.

## 2021-05-26 ENCOUNTER — OFFICE VISIT (OUTPATIENT)
Dept: INTERNAL MEDICINE CLINIC | Age: 61
End: 2021-05-26
Payer: COMMERCIAL

## 2021-05-26 VITALS
HEART RATE: 62 BPM | WEIGHT: 186.2 LBS | OXYGEN SATURATION: 100 % | DIASTOLIC BLOOD PRESSURE: 80 MMHG | SYSTOLIC BLOOD PRESSURE: 120 MMHG | BODY MASS INDEX: 29.22 KG/M2 | TEMPERATURE: 97.3 F | HEIGHT: 67 IN

## 2021-05-26 DIAGNOSIS — R29.898 RIGHT LEG WEAKNESS: ICD-10-CM

## 2021-05-26 DIAGNOSIS — M62.838 MUSCLE SPASM: ICD-10-CM

## 2021-05-26 DIAGNOSIS — R20.2 PARESTHESIA: ICD-10-CM

## 2021-05-26 DIAGNOSIS — E78.2 MIXED HYPERLIPIDEMIA: ICD-10-CM

## 2021-05-26 DIAGNOSIS — F32.A DEPRESSION, UNSPECIFIED DEPRESSION TYPE: ICD-10-CM

## 2021-05-26 DIAGNOSIS — K21.9 GASTROESOPHAGEAL REFLUX DISEASE WITHOUT ESOPHAGITIS: ICD-10-CM

## 2021-05-26 DIAGNOSIS — I10 ESSENTIAL HYPERTENSION: Primary | ICD-10-CM

## 2021-05-26 PROCEDURE — 99204 OFFICE O/P NEW MOD 45 MIN: CPT | Performed by: FAMILY MEDICINE

## 2021-05-26 RX ORDER — FOSINOPRIL SODIUM 40 MG/1
40 TABLET ORAL DAILY
COMMUNITY
End: 2021-09-22 | Stop reason: SDUPTHER

## 2021-05-26 RX ORDER — METHOCARBAMOL 750 MG/1
750 TABLET, FILM COATED ORAL NIGHTLY PRN
Qty: 30 TABLET | Refills: 0 | Status: SHIPPED | OUTPATIENT
Start: 2021-05-26 | End: 2022-01-14 | Stop reason: SDUPTHER

## 2021-05-26 SDOH — ECONOMIC STABILITY: FOOD INSECURITY: WITHIN THE PAST 12 MONTHS, THE FOOD YOU BOUGHT JUST DIDN'T LAST AND YOU DIDN'T HAVE MONEY TO GET MORE.: NEVER TRUE

## 2021-05-26 SDOH — ECONOMIC STABILITY: FOOD INSECURITY: WITHIN THE PAST 12 MONTHS, YOU WORRIED THAT YOUR FOOD WOULD RUN OUT BEFORE YOU GOT MONEY TO BUY MORE.: NEVER TRUE

## 2021-05-26 ASSESSMENT — ENCOUNTER SYMPTOMS
BACK PAIN: 0
COUGH: 0
CONSTIPATION: 0
BLOOD IN STOOL: 0
DIARRHEA: 0
NAUSEA: 0
EYES NEGATIVE: 1
SHORTNESS OF BREATH: 0
ABDOMINAL PAIN: 0

## 2021-05-26 ASSESSMENT — SOCIAL DETERMINANTS OF HEALTH (SDOH): HOW HARD IS IT FOR YOU TO PAY FOR THE VERY BASICS LIKE FOOD, HOUSING, MEDICAL CARE, AND HEATING?: NOT HARD AT ALL

## 2021-05-26 ASSESSMENT — PATIENT HEALTH QUESTIONNAIRE - PHQ9: SUM OF ALL RESPONSES TO PHQ QUESTIONS 1-9: 0

## 2021-05-26 NOTE — PROGRESS NOTES
right leg     per old chart pt in ER with c/o weakness right leg, feeling\"needles/pins sensation right hip to right foot\" consult with Dr Deedee Vo       Past Surgical History:   Procedure Laterality Date    [address] [facility]    l[facility]     CERVIX BIOPSY  04/2016    cone bx     COLONOSCOPY  last one 2013?  COLPOSCOPY  03/2016    TUBAL LIGATION  1990       Family History   Problem Relation Age of Onset    Diabetes Mother     High Blood Pressure Mother     Heart Disease Father        Social History     Tobacco Use    Smoking status: Former Smoker     Packs/day: 0.50     Years: 2.00     Pack years: 1.00    Smokeless tobacco: Never Used    Tobacco comment: last smoked 40 yrs ago    Vaping Use    Vaping Use: Never used   Substance Use Topics    Alcohol use: Yes     Alcohol/week: 1.0 standard drinks     Types: 1 Glasses of wine per week     Comment: weekly/ per pt on 1/28/2019\"2-2 times per week\"    Drug use: No       Current Outpatient Medications   Medication Sig Dispense Refill    fosinopril (MONOPRIL) 40 MG tablet Take 40 mg by mouth daily      methocarbamol (ROBAXIN-750) 750 MG tablet Take 1 tablet by mouth nightly as needed (muscle spasm) 30 tablet 0    aspirin 81 MG EC tablet Take 1 tablet by mouth daily 30 tablet 0    atorvastatin (LIPITOR) 40 MG tablet Take 1 tablet by mouth nightly 30 tablet 0    famotidine (PEPCID) 20 MG tablet Take 1 tablet by mouth 2 times daily 60 tablet 0    hydrochlorothiazide (HYDRODIURIL) 25 MG tablet Take 25 mg by mouth daily      escitalopram (LEXAPRO) 10 MG tablet Take 10 mg by mouth daily       No current facility-administered medications for this visit. OBJECTIVE:    /80   Pulse 62   Temp 97.3 °F (36.3 °C)   Ht 5' 7\" (1.702 m)   Wt 186 lb 3.2 oz (84.5 kg)   LMP 01/28/2009   SpO2 100%   Breastfeeding No   BMI 29.16 kg/m²     Physical Exam  Vitals reviewed. Constitutional:       General: She is not in acute distress.   HENT:      Head: Normocephalic and atraumatic. Right Ear: Tympanic membrane normal.      Left Ear: Tympanic membrane normal.   Eyes:      Extraocular Movements: Extraocular movements intact. Pupils: Pupils are equal, round, and reactive to light. Cardiovascular:      Rate and Rhythm: Normal rate and regular rhythm. Pulmonary:      Effort: Pulmonary effort is normal. No respiratory distress. Breath sounds: Normal breath sounds. Abdominal:      General: Bowel sounds are normal. There is no distension. Palpations: Abdomen is soft. Tenderness: There is no abdominal tenderness. Musculoskeletal:      Cervical back: Neck supple. Right lower leg: No edema. Left lower leg: No edema. Skin:     Findings: No rash. Neurological:      Mental Status: She is alert and oriented to person, place, and time. Cranial Nerves: No cranial nerve deficit. Sensory: Sensory deficit (RLE) present. Motor: No weakness. Psychiatric:         Mood and Affect: Mood normal.         ASSESSMENT:  1. Essential hypertension    2. Mixed hyperlipidemia    3. Depression, unspecified depression type    4. Gastroesophageal reflux disease without esophagitis    5. Muscle spasm    6. Right leg weakness    7.  Paresthesia        PLAN:    Orders Placed This Encounter   Procedures    Comprehensive Metabolic Panel    CBC Auto Differential    Lipid Panel    TSH WITH REFLEX TO FT4    Urinalysis with Microscopic    VITAMIN B12 & FOLATE    External Referral To Neurology       Orders Placed This Encounter   Medications    methocarbamol (ROBAXIN-750) 750 MG tablet     Sig: Take 1 tablet by mouth nightly as needed (muscle spasm)     Dispense:  30 tablet     Refill:  0   Obtain old records   Obtain labs as ordered  Reviewed- imaging- MRI- brain, lumbar, thoracic  Reviewed labs-CBC, CSF Cx, CMP  Reviewed [facility] neurology notes  Continue medications  Start Robaxin 750  ADR's explained  Refer to new neurologist  Ornelda Fay or

## 2021-06-02 ENCOUNTER — TELEPHONE (OUTPATIENT)
Dept: INTERNAL MEDICINE CLINIC | Age: 61
End: 2021-06-02

## 2021-06-02 NOTE — TELEPHONE ENCOUNTER
Referral to Neurology and all pertinent information faxed to [address] [place] [address] at [phone] pending scheduling.

## 2021-09-15 ENCOUNTER — OFFICE VISIT (OUTPATIENT)
Dept: NEUROLOGY | Age: 61
End: 2021-09-15
Payer: COMMERCIAL

## 2021-09-15 VITALS
HEART RATE: 85 BPM | WEIGHT: 170 LBS | OXYGEN SATURATION: 97 % | SYSTOLIC BLOOD PRESSURE: 112 MMHG | DIASTOLIC BLOOD PRESSURE: 80 MMHG | BODY MASS INDEX: 26.68 KG/M2 | HEIGHT: 67 IN

## 2021-09-15 DIAGNOSIS — G37.9 DEMYELINATING DISEASE OF CENTRAL NERVOUS SYSTEM (HCC): Primary | ICD-10-CM

## 2021-09-15 DIAGNOSIS — G62.9 PERIPHERAL NERVE DISEASE: ICD-10-CM

## 2021-09-15 PROCEDURE — 99245 OFF/OP CONSLTJ NEW/EST HI 55: CPT | Performed by: PSYCHIATRY & NEUROLOGY

## 2021-09-15 NOTE — PROGRESS NOTES
9/15/21    [facility]  1960    Chief Complaint   Patient presents with    New Patient     few years ago had to focus to walk. ER r/o stroke    Numbness     BLE        History of Present Illness  David Clemons is a 57 y.o. female presenting today for evaluation of:  Lower extremity weakness and paresthesias, and abnormal MRIs    She states that in 2019 she was hospitalized at the beginning of the year after returning to work and feeling \"wobbly\". She stated that she felt like she had to \"focus to walk\". She then developed right leg weakness. She went to the emergency room and was admitted. She had an MRI of her brain which did not reveal any stroke. She had MRIs of her lumbar, thoracic, and cervical spine. The thoracic and lumbar spine did not reveal any significant abnormalities. She does have a history of lumbar spinal surgery which resolved right foot drop at the time (she was 31years old at that time). The cervical spine MRI showed some straightening of the cervical lordosis and some degenerative changes with some degree of spinal canal stenosis. No convincing demyelinating changes seen throughout the cord. Her MRI of the brain did have some white matter change and she did undergo a lumbar puncture for evaluation of multiple sclerosis. Of clonal bands were positive. She did see a neurologist in [place], [place] and was subsequently referred to an [address] [place] [address].  She is unsure if it was an MS specialist, neurosurgeon, or other type of neurologist.  Based on the documentation it appears this may have been a neurosurgeon as discussion was regarding possible fixation and although this could be done in the cervical spine region the physician was not confident it would resolve her symptoms. Presently, she tells me that the right lower extremity weakness resolved with therapy. She does still have some sensory decrease in the lower extremities.   She states that the left leg has difficulty feeling hot and cold sensation. She states that the right leg feels \"numb\" though she has actual touch sensation. She does admit to some balance issues still when she is not thinking about it or moving too quickly. Subjective    Review of Symptoms:  Neurologic   Symptoms: no bladder symptoms, no bowel symptoms, no confusion, no memory loss, no speech disorder, no visual loss, no double vision, no dizziness, no vertigo, no loss of hearing, no weakness, no headaches, no seizures, no syncope and no excessive fatigue, + gait disturbance, sensory change    Current Outpatient Medications   Medication Sig Dispense Refill    fosinopril (MONOPRIL) 40 MG tablet Take 40 mg by mouth daily      methocarbamol (ROBAXIN-750) 750 MG tablet Take 1 tablet by mouth nightly as needed (muscle spasm) 30 tablet 0    aspirin 81 MG EC tablet Take 1 tablet by mouth daily 30 tablet 0    famotidine (PEPCID) 20 MG tablet Take 1 tablet by mouth 2 times daily 60 tablet 0    hydrochlorothiazide (HYDRODIURIL) 25 MG tablet Take 25 mg by mouth daily      atorvastatin (LIPITOR) 40 MG tablet Take 1 tablet by mouth nightly (Patient not taking: Reported on 9/15/2021) 30 tablet 0    escitalopram (LEXAPRO) 10 MG tablet Take 10 mg by mouth daily (Patient not taking: Reported on 9/15/2021)       No current facility-administered medications for this visit. Past Medical History:   Diagnosis Date    Acid reflux     Bilateral renal cysts     Cervical spinal stenosis     Depression     History of HPV infection     per old chart    Hx of fall 12/31/2018    Hyperlipidemia     Hypertension     Weakness of right leg     per old chart pt in ER with c/o weakness right leg, feeling\"needles/pins sensation right hip to right foot\" consult with Dr Barrett Pena       Past Surgical History:   Procedure Laterality Date    BACK SURGERY  1992    lumbar     CERVIX BIOPSY  04/2016    cone bx     COLONOSCOPY  last one 2013?     COLPOSCOPY  03/2016    TUBAL LIGATION  1990        Social History     Socioeconomic History    Marital status:      Spouse name: None    Number of children: 2    Years of education: None    Highest education level: None   Occupational History     Employer: [employer]   Tobacco Use    Smoking status: Former Smoker     Packs/day: 0.50     Years: 2.00     Pack years: 1.00    Smokeless tobacco: Never Used    Tobacco comment: last smoked 40 yrs ago    Vaping Use    Vaping Use: Never used   Substance and Sexual Activity    Alcohol use: Yes     Alcohol/week: 1.0 standard drinks     Types: 1 Glasses of wine per week     Comment: weekly/ per pt on 1/28/2019\"2-2 times per week\"    Drug use: No    Sexual activity: Yes     Partners: Male   Other Topics Concern    None   Social History Narrative    None     Social Determinants of Health     Financial Resource Strain: Low Risk     Difficulty of Paying Living Expenses: Not hard at all   Food Insecurity: No Food Insecurity    Worried About Running Out of Food in the Last Year: Never true    Florina of Food in the Last Year: Never true   Transportation Needs: No Transportation Needs    Lack of Transportation (Medical): No    Lack of Transportation (Non-Medical):  No   Physical Activity:     Days of Exercise per Week:     Minutes of Exercise per Session:    Stress:     Feeling of Stress :    Social Connections:     Frequency of Communication with Friends and Family:     Frequency of Social Gatherings with Friends and Family:     Attends Protestant Services:     Active Member of Clubs or Organizations:     Attends Club or Organization Meetings:     Marital Status:    Intimate Partner Violence:     Fear of Current or Ex-Partner:     Emotionally Abused:     Physically Abused:     Sexually Abused:        Family History   Problem Relation Age of Onset    Diabetes Mother     High Blood Pressure Mother     Heart Disease Father        Objective    Physical Exam:    Constitutional   Weight: well nourished  Heart/Vascular   Rate and Rhythm: RRR   Murmurs: none   Arterial Pulses:  no carotid bruits  Neck   Appearance/Palpation/Auscultation: supple  Mental Status   Orientation: oriented to person, oriented to place, oriented to problem and oriented to time   Mood/Affect: appropriate mood and appropriate affect   Memory/Other: recent memory intact, remote memory intact, fund of knowledge intact, attention span normal and concentration normal  Language   Language: (normal) articulation, no dysarthria, (normal) language and no dysphasia/aphasia  Cranial Nerves   CN II Right: visual fields appear intact   CN II Left: visual fields appear intact   CN III, IV, VI: EOM extraocular muscle strength normal, no nystagmus and normal pursuit   CN III: pupil normal size, pupil reactive to light and dark, pupil accomodates and no ptosis   CN IV: normal   CN VI: normal   CN V Right: normal sensation and muscles of mastication intact   CN V Left: normal sensation and muscles of mastication intact   CN VII Right: normal facial expression   CN VII Left: normal facial expression   CN VIII Right: normal hearing to finger rub and hearing in tact to normal conversation   CN VIII Left: normal hearing to finger rub and hearing in tact to normal conversation   CN IX,X: normal swallowing and intact sensory to pharynx/gag   CN XI Right: normal sternocleidomastoid and normal trapezius   CN XI Left: normal sternocleidomastoid and normal trapezius   CN XII: no tremors of the tongue, no fasciculation of the tongue, tongue protrudes midline, normal power to left and normal power to right  Gait and Stance   Gait/Posture: station normal, ambulates independently, gait normal, tiptoe normal, tandem gait normal, heel walk normal and Romberg's test normal  Motor/Coordination Exam   General: no bradykinesia, no dyskinesia, no tremors, no chorea, no athetosis and no myoclonus   Right Upper Extremity: normal motor strength, normal bulk and normal tone   Left Upper Extremity: normal motor strength, normal bulk and normal tone   Right Lower Extremity: normal motor strength, normal bulk and normal tone   Left Lower Extremity: normal motor strength, normal bulk and normal tone   Coordination: normal finger-to-nose, normal heel-to-shin, no drift and rapid alternating movements normal  Reflexes   Reflexes Right: DTRS are normal throughout   Reflexes Left: DTRS are normal throughout   Plantar Reflex Right: response downgoing   Plantar Reflex Left: response downgoing   Hoffmans Reflex Right: absent   Hoffmans Reflex Left: absent   Frontal Release Signs: frontal release signs absent  Sensory   Sensation: normal light touch, normal pinprick, normal temperature, normal vibration, normal position, normal DSS and no neglect  Spine   Cervical Spine: full ROM, no dystonia  and no tenderness   Thoracic Spine: full ROM, no tenderness, no spasms, no bony abnormalities and normal curvature   Low Back: full ROM, no pain, no spasms and no bony abnormalities  Lungs   Auscultation: normal breath sounds  Skin   Inspection: no cyanosis, no jaundice, no lesions, no rashes and no tattoos      /80 (Site: Right Upper Arm, Position: Sitting, Cuff Size: Medium Adult)   Pulse 85   Ht 5' 7\" (1.702 m)   Wt 170 lb (77.1 kg)   LMP 01/28/2009   SpO2 97%   BMI 26.63 kg/m²     Assessment and Plan     Diagnosis Orders   1. Demyelinating disease of central nervous system [facility])  MRI BRAIN W WO CONTRAST   2. Peripheral nerve disease  Nerve Conduction Test with EMG     I reviewed Cristela's MRI of the brain. The white matter changes rather nonspecific and the pattern appears most consistent with small vessel ischemic change seen with normal aging and more often those with vascular risk factors such as hypertension, hyperlipidemia, diabetes, sleep apnea, and smoking. She does admit to history of hypertension and hyperlipidemia.   No convincing signs of demyelinating disease was seen throughout the spinal cord by my review. Interestingly, her oligoclonal bands were positive on her spinal fluid in 2019. I will obtain repeat MRI of the brain without contrast to see if there is any interval change that may raise suspicion for multiple sclerosis though her history is not all that convincing. She has had progression of her lower extremity dysesthesias. I will obtain an EMG of the lower extremity to evaluate this in better detail. I will follow up on the labs performed by her PCP which are typical for potential reversible causes of peripheral neuropathy. Return in about 3 months (around 12/15/2021).     Tu Graham,

## 2021-09-22 ENCOUNTER — OFFICE VISIT (OUTPATIENT)
Dept: INTERNAL MEDICINE CLINIC | Age: 61
End: 2021-09-22
Payer: COMMERCIAL

## 2021-09-22 VITALS
OXYGEN SATURATION: 97 % | WEIGHT: 173.4 LBS | RESPIRATION RATE: 16 BRPM | HEART RATE: 72 BPM | DIASTOLIC BLOOD PRESSURE: 80 MMHG | TEMPERATURE: 97.2 F | SYSTOLIC BLOOD PRESSURE: 130 MMHG | BODY MASS INDEX: 27.16 KG/M2

## 2021-09-22 DIAGNOSIS — K21.9 GASTROESOPHAGEAL REFLUX DISEASE WITHOUT ESOPHAGITIS: ICD-10-CM

## 2021-09-22 DIAGNOSIS — I10 ESSENTIAL HYPERTENSION: Primary | ICD-10-CM

## 2021-09-22 DIAGNOSIS — F32.A DEPRESSION, UNSPECIFIED DEPRESSION TYPE: ICD-10-CM

## 2021-09-22 PROCEDURE — 99213 OFFICE O/P EST LOW 20 MIN: CPT | Performed by: FAMILY MEDICINE

## 2021-09-22 RX ORDER — HYDROCHLOROTHIAZIDE 25 MG/1
25 TABLET ORAL DAILY
Qty: 90 TABLET | Refills: 1 | Status: SHIPPED | OUTPATIENT
Start: 2021-09-22 | End: 2022-01-14 | Stop reason: SDUPTHER

## 2021-09-22 RX ORDER — FOSINOPRIL SODIUM 40 MG/1
40 TABLET ORAL DAILY
Qty: 90 TABLET | Refills: 1 | Status: SHIPPED | OUTPATIENT
Start: 2021-09-22 | End: 2022-01-14

## 2021-09-22 RX ORDER — ESCITALOPRAM OXALATE 10 MG/1
10 TABLET ORAL DAILY
Qty: 90 TABLET | Refills: 1 | Status: SHIPPED | OUTPATIENT
Start: 2021-09-22 | End: 2022-01-14 | Stop reason: SDUPTHER

## 2021-09-22 RX ORDER — FAMOTIDINE 20 MG/1
20 TABLET, FILM COATED ORAL 2 TIMES DAILY
Qty: 180 TABLET | Refills: 1 | Status: SHIPPED | OUTPATIENT
Start: 2021-09-22 | End: 2022-01-14 | Stop reason: SDUPTHER

## 2021-09-22 NOTE — PROGRESS NOTES
Symone Parker (:  1960) is a 57 y.o. female,Established patient, here for evaluation of the following chief complaint(s):  Hypertension (Pt presents for 4 month f/u.) and Other (Pt did not get labs, she will do this AM.)         ASSESSMENT/PLAN:  1. Essential hypertension, chronic  -     hydroCHLOROthiazide (HYDRODIURIL) 25 MG tablet; Take 1 tablet by mouth daily, Disp-90 tablet, R-1Normal  -     fosinopril (MONOPRIL) 40 MG tablet; Take 1 tablet by mouth daily, Disp-90 tablet, R-1Normal  2. Depression, unspecified depression type, chronic  -     escitalopram (LEXAPRO) 10 MG tablet; Take 1 tablet by mouth daily, Disp-90 tablet, R-1Normal  3. Gastroesophageal reflux disease without esophagitis  -     famotidine (PEPCID) 20 MG tablet; Take 1 tablet by mouth 2 times daily, Disp-180 tablet, R-1Normal  Obtain lab as previously ordered  Obtain [facility]  On this date 2021 I have spent 20 minutes reviewing previous notes, test results and face to face with the patient discussing the diagnosis and importance of compliance with the treatment plan as well as documenting on the day of the visit. Return in about 5 months (around 2022) for HTN. Subjective   SUBJECTIVE/OBJECTIVE:  HPI: This 58 yo f here for the following  Patient Active Problem List    Diagnosis Date Noted    Cervical spinal stenosis     Gastroesophageal reflux disease without esophagitis 2021    Hyperlipidemia     Depression     Hypertension     Ataxia 2019    Right leg weakness 2019       HTN- stable on medications  Depression and GERD- stable  Pt did not do labs yet  EMG and MRI of the brain ordered per neurology for her LE dysesthesias      Review of Systems   Constitutional: Negative for diaphoresis and fever. Respiratory: Negative for cough and shortness of breath. Cardiovascular: Negative for chest pain and palpitations.    Gastrointestinal: Negative for abdominal pain and nausea. Genitourinary: Negative for difficulty urinating. Musculoskeletal: Negative for back pain. Neurological: Negative for dizziness and headaches. Psychiatric/Behavioral: Negative for hallucinations. No Known Allergies  Current Outpatient Medications   Medication Sig Dispense Refill    famotidine (PEPCID) 20 MG tablet Take 1 tablet by mouth 2 times daily 180 tablet 1    escitalopram (LEXAPRO) 10 MG tablet Take 1 tablet by mouth daily 90 tablet 1    hydroCHLOROthiazide (HYDRODIURIL) 25 MG tablet Take 1 tablet by mouth daily 90 tablet 1    fosinopril (MONOPRIL) 40 MG tablet Take 1 tablet by mouth daily 90 tablet 1    methocarbamol (ROBAXIN-750) 750 MG tablet Take 1 tablet by mouth nightly as needed (muscle spasm) 30 tablet 0    aspirin 81 MG EC tablet Take 1 tablet by mouth daily 30 tablet 0     No current facility-administered medications for this visit. Vitals:    09/22/21 0825 09/22/21 0921   BP: (!) 126/92 130/80   Pulse: 72    Resp: 16    Temp: 97.2 °F (36.2 °C)    SpO2: 97%    Weight: 173 lb 6.4 oz (78.7 kg)      Objective   Physical Exam  Vitals reviewed. Constitutional:       General: She is not in acute distress. Eyes:      Extraocular Movements: Extraocular movements intact. Conjunctiva/sclera: Conjunctivae normal.   Cardiovascular:      Rate and Rhythm: Normal rate and regular rhythm. Pulmonary:      Effort: Pulmonary effort is normal. No respiratory distress. Breath sounds: Normal breath sounds. Abdominal:      General: Bowel sounds are normal.      Palpations: Abdomen is soft. Tenderness: There is no abdominal tenderness. Musculoskeletal:      Cervical back: Neck supple. Right lower leg: No edema. Left lower leg: No edema. Neurological:      Mental Status: She is alert and oriented to person, place, and time.    Psychiatric:         Mood and Affect: Mood normal.                An electronic signature was used to authenticate this note.    --Rehan Friday, DO

## 2021-09-26 ASSESSMENT — ENCOUNTER SYMPTOMS
NAUSEA: 0
SHORTNESS OF BREATH: 0
BACK PAIN: 0
COUGH: 0
ABDOMINAL PAIN: 0

## 2021-09-29 ENCOUNTER — HOSPITAL ENCOUNTER (OUTPATIENT)
Dept: MRI IMAGING | Age: 61
Discharge: HOME | End: 2021-09-29
Payer: COMMERCIAL

## 2021-09-29 DIAGNOSIS — G37.9 DEMYELINATING DISEASE OF CENTRAL NERVOUS SYSTEM (HCC): ICD-10-CM

## 2021-09-29 PROCEDURE — 70553 MRI BRAIN STEM W/O & W/DYE: CPT

## 2021-09-29 PROCEDURE — 6360000004 HC RX CONTRAST MEDICATION: Performed by: PSYCHIATRY & NEUROLOGY

## 2021-09-29 PROCEDURE — A9579 GAD-BASE MR CONTRAST NOS,1ML: HCPCS | Performed by: PSYCHIATRY & NEUROLOGY

## 2021-09-29 RX ADMIN — GADOTERIDOL 15 ML: 279.3 INJECTION, SOLUTION INTRAVENOUS at 10:45

## 2021-10-25 ENCOUNTER — OFFICE VISIT (OUTPATIENT)
Dept: NEUROLOGY | Age: 61
End: 2021-10-25
Payer: COMMERCIAL

## 2021-10-25 DIAGNOSIS — G62.9 PERIPHERAL NERVE DISEASE: ICD-10-CM

## 2021-10-25 DIAGNOSIS — M54.17 LUMBOSACRAL RADICULOPATHY AT S1: Primary | ICD-10-CM

## 2021-10-25 PROCEDURE — 95886 MUSC TEST DONE W/N TEST COMP: CPT | Performed by: PHYSICAL MEDICINE & REHABILITATION

## 2021-10-25 PROCEDURE — 95910 NRV CNDJ TEST 7-8 STUDIES: CPT | Performed by: PHYSICAL MEDICINE & REHABILITATION

## 2021-10-25 NOTE — PROGRESS NOTES
EMG    Risks and benefits of study discussed. Specific and common risks of pain and bleeding as well as uncommon side effects of infection, hematoma and vasovagal episodes    Patient agreeable to testing and consents to such. Clinical: Many years of right greater than left lower limb pain, lumbar and lower thoracic pain. History of L2-3 lumbar surgery many years ago with good results. Motor NCS:  Tibial amplitudes, latencies and velocities are normal  Peroneal amplitudes are severely depressed with normal velocities. Sensory NCS:  Bilateral sural and peroneal sensory responses demonstrate normal amplitudes and latencies. Needle EMG: Mild, chronic S1 neurogenic changes in right greater than left leg segments, motor unit enlargement without fibrillations. Impression:  #1 mild, right greater than left, chronic S1 neurogenic changes suggesting chronic lumbosacral radiculopathies in the S1 root levels. #2 no convincing evidence of motor axon loss in the L2-L5 myotomes. No evidence suggesting plexopathy or mononeuropathy involving the lower limbs.

## 2021-11-05 ENCOUNTER — TELEPHONE (OUTPATIENT)
Dept: NEUROLOGY | Age: 61
End: 2021-11-05

## 2021-11-05 DIAGNOSIS — M54.17 LUMBOSACRAL RADICULOPATHY AT S1: Primary | ICD-10-CM

## 2021-11-05 NOTE — PROGRESS NOTES
Mild chronic changes in the S1 myotomes in both legs (more on the right than left) suggesting chronic lumbosacral radiculopathies bilaterally. Again, these are mild and chronic. If her legs are still quite bothersome to her, we could obtain an MRI of the lumbar spine though given the mild chronic nature of the EMG findings I would not think therapy findings severe enough to warrant surgery. See if she would like to pursue an MRI of the lumbar spine without contrast or hold off at this time.

## 2021-11-05 NOTE — TELEPHONE ENCOUNTER
Left message for Ollis Severs to call back to discuss EMG results below. Ze Warren DO (Physician) [facility]     Mild chronic changes in the S1 myotomes in both legs (more on the right than left) suggesting chronic lumbosacral radiculopathies bilaterally. Again, these are mild and chronic. If her legs are still quite bothersome to her, we could obtain an MRI of the lumbar spine though given the mild chronic nature of the EMG findings I would not think therapy findings severe enough to warrant surgery. See if she would like to pursue an MRI of the lumbar spine without contrast or hold off at this time.

## 2021-11-05 NOTE — TELEPHONE ENCOUNTER
Esvin Lassiter called back for EMG results. I discussed them with her. She would like to have the MRI Lumbar spine done. I told her I will let her know when the order has been placed so she can call to make an appointment for it. She also wants to know how the MRI Brain came back that she had done on 9/29/21?

## 2021-11-08 NOTE — TELEPHONE ENCOUNTER
Her MRI of the brain appeared stable when compared to 2019 demonstrating only mild T2/flair hyperintensities consistent with changes seen in small vessel change consistent with normal aging do not see any features suggesting multiple sclerosis.

## 2021-11-19 ENCOUNTER — HOSPITAL ENCOUNTER (OUTPATIENT)
Dept: MRI IMAGING | Age: 61
Discharge: HOME | End: 2021-11-19
Payer: COMMERCIAL

## 2021-11-19 DIAGNOSIS — M54.17 LUMBOSACRAL RADICULOPATHY AT S1: ICD-10-CM

## 2021-11-19 PROCEDURE — 72148 MRI LUMBAR SPINE W/O DYE: CPT

## 2021-11-22 ENCOUNTER — TELEPHONE (OUTPATIENT)
Dept: NEUROLOGY | Age: 61
End: 2021-11-22

## 2021-11-22 NOTE — TELEPHONE ENCOUNTER
Patient aware of MRI results, she would like to go ahead and try something for the symptoms she is having.

## 2021-11-22 NOTE — TELEPHONE ENCOUNTER
See if she is agreeable to gabapentin 100 mg 3 times daily. I will send in a prescription if she is agreeable. Nayeli Ingram

## 2021-11-23 DIAGNOSIS — R20.8 DYSESTHESIA: Primary | ICD-10-CM

## 2021-11-23 RX ORDER — GABAPENTIN 100 MG/1
100 CAPSULE ORAL 3 TIMES DAILY
Qty: 90 CAPSULE | Refills: 5 | Status: SHIPPED | OUTPATIENT
Start: 2021-11-23 | End: 2022-01-14

## 2021-11-29 NOTE — TELEPHONE ENCOUNTER
[facility] sent a fax stating her insurance requires a 90 day supply of medications. I called and spoke to the pharmacist and gave him a verbal okay to fill 90 days.

## 2021-12-10 ENCOUNTER — OFFICE VISIT (OUTPATIENT)
Dept: NEUROLOGY | Age: 61
End: 2021-12-10
Payer: COMMERCIAL

## 2021-12-10 VITALS
BODY MASS INDEX: 26.15 KG/M2 | HEIGHT: 67 IN | DIASTOLIC BLOOD PRESSURE: 78 MMHG | HEART RATE: 73 BPM | OXYGEN SATURATION: 96 % | WEIGHT: 166.6 LBS | SYSTOLIC BLOOD PRESSURE: 124 MMHG

## 2021-12-10 DIAGNOSIS — R26.0 ATAXIC GAIT: Primary | ICD-10-CM

## 2021-12-10 PROCEDURE — 99213 OFFICE O/P EST LOW 20 MIN: CPT | Performed by: PSYCHIATRY & NEUROLOGY

## 2021-12-10 NOTE — LETTER
[facility]  [address]  [place]  Phone: [phone]  Fax: [phone]    Freddie Mercedes DO    December 13, 2021     [address] [place] [id number]    Patient: Nicanor Iraheta   MR Number: [id number]   YOB: 1960   Date of Visit: 12/10/2021       Dear Phoenix Hutchins: Thank you for referring Thelma Cramer to me for evaluation/treatment. Below are the relevant portions of my assessment and plan of care. If you have questions, please do not hesitate to call me. I look forward to following Jono Vann along with you.     Sincerely,      Freddie Mercedes DO

## 2021-12-10 NOTE — LETTER
[facility]  [facility] [address]  [address]  Phone: [phone]  Fax: [phone] R Pavithra Patino DO        December 10, 2021     Patient: Hien Padron   YOB: 1960   Date of Visit: 12/10/2021       To Whom It May Concern:              Sincerely,        Harsh Hernandez DO

## 2021-12-13 NOTE — PROGRESS NOTES
12/13/21    [facility]  1960    Chief Complaint   Patient presents with    3 Month Follow-Up     pt presents for numbness in BLE; pt states the pain has decreased since starting gabapentin; but numbness is still there and accompanied with some sort of \"muscle spasm\" in her lower back and some bilat leg cramping       History of Present Illness  Amber Jonas is a 57 y.o. female presenting today for follow-up of:  Amber Jonas is here in follow-up for lower extremity weakness and dysesthesias. She had an EMG which revealed mild chronic changes in the S1 myotomes in both legs, more on the right than the left, suggesting chronic lumbosacral radiculopathies bilaterally. An MRI of the brain was obtained which revealed mild T2/flair hyperintensities consistent with changes seen in small vessel ischemic change, revealing no features of multiple sclerosis. She is starting gabapentin 1 mg 3 times daily for lower extremity dysesthesias. She states this has helped with the pain in her legs as well as her back pain. She had an MRI of her lumbar spine which showed changes consistent with mild stenosis without significant impingement upon the neural structures. Current Outpatient Medications   Medication Sig Dispense Refill    gabapentin (NEURONTIN) 100 MG capsule Take 1 capsule by mouth 3 times daily for 151 days. 90 capsule 5    famotidine (PEPCID) 20 MG tablet Take 1 tablet by mouth 2 times daily 180 tablet 1    escitalopram (LEXAPRO) 10 MG tablet Take 1 tablet by mouth daily 90 tablet 1    hydroCHLOROthiazide (HYDRODIURIL) 25 MG tablet Take 1 tablet by mouth daily 90 tablet 1    fosinopril (MONOPRIL) 40 MG tablet Take 1 tablet by mouth daily 90 tablet 1    methocarbamol (ROBAXIN-750) 750 MG tablet Take 1 tablet by mouth nightly as needed (muscle spasm) 30 tablet 0    aspirin 81 MG EC tablet Take 1 tablet by mouth daily 30 tablet 0     No current facility-administered medications for this visit. Physical Exam:    Mental Status   Orientation: oriented to person, oriented to place, oriented to problem and oriented to time    Mood/affectappropriate mood and appropriate affect   Memory/Other: recent memory intact, remote memory intact, fund of knowledge intact, attention span normal and concentration normal  Language  Language: (normal) language, no dysarthria, (normal) articulation and no dysphasia/aphasia  Cranial Nerves   Eyes: pupils normal size and reactive to light and visual fields appear full   CN III, IV, VI : extraocular muscle strength normal, normal pursuit, no nystagmus and no ptosis   Facial Motor: normal facial motor  Motor/Coordination Exam   Power: motor strength appears intact throughout, no arm drift and normal tone  Gait and Stance   Gait/Posture: station normal, casual gait normal and ambulates independently         /78 (Site: Left Upper Arm, Position: Sitting, Cuff Size: Large Adult)   Pulse 73   Ht 5' 7\" (1.702 m)   Wt 166 lb 9.6 oz (75.6 kg)   LMP 01/28/2009   SpO2 96%   BMI 26.09 kg/m²     Assessment and Plan     Diagnosis Orders   1. Ataxic gait         Cristela's MRI did not reveal any significant abnormality to suggest multiple sclerosis. MRI of the lumbar spine showed mild degenerative changes. Her EMG of the lower extremities suggested mild chronic [id number]. She is having some benefit after addition of gabapentin 100 mg 3 times daily regards to back pain and her peripheral dysesthesias. Return in about 6 months (around 6/10/2022).     Gab Bronson DO

## 2022-01-03 ENCOUNTER — TELEPHONE (OUTPATIENT)
Dept: FAMILY MEDICINE CLINIC | Age: 62
End: 2022-01-03

## 2022-01-03 NOTE — TELEPHONE ENCOUNTER
Patient called this on-call provider with Covid-like symptoms. Reports she tested positive on a home test today. Denies any respiratory distress. Provider advised over-the-counter symptom treatment. Coricidin if hypertension diagnosis present. Emergency room visit if respiratory distress occurs. Also advised patient to call PCP if needing a work note. 10-day isolation from positive test date.   Patient states understanding.    -Late entry  EUGENIO Woodward - NP

## 2022-01-04 ENCOUNTER — TELEPHONE (OUTPATIENT)
Dept: INTERNAL MEDICINE CLINIC | Age: 62
End: 2022-01-04

## 2022-01-04 NOTE — TELEPHONE ENCOUNTER
Patient tested positive for covid on home test she has cough and fever . She is doing ok with the covid . She had severe stomach cramping yesterday, diarrhea yellow in color she wonder if it could be diverticulitis. Please advise. Her cramping is a dull ache today.

## 2022-01-04 NOTE — TELEPHONE ENCOUNTER
Spoke to pt. Discussed symptoms. Pt can go  to ER if in distress.  . Otherwise she plans to finish quarantine and come in for evaluation

## 2022-01-12 ENCOUNTER — NURSE TRIAGE (OUTPATIENT)
Dept: OTHER | Facility: CLINIC | Age: 62
End: 2022-01-12

## 2022-01-14 ENCOUNTER — OFFICE VISIT (OUTPATIENT)
Dept: INTERNAL MEDICINE CLINIC | Age: 62
End: 2022-01-14
Payer: COMMERCIAL

## 2022-01-14 VITALS
HEART RATE: 92 BPM | TEMPERATURE: 97.1 F | DIASTOLIC BLOOD PRESSURE: 74 MMHG | HEIGHT: 67 IN | OXYGEN SATURATION: 96 % | WEIGHT: 162.4 LBS | BODY MASS INDEX: 25.49 KG/M2 | SYSTOLIC BLOOD PRESSURE: 110 MMHG

## 2022-01-14 DIAGNOSIS — M54.18 RADICULOPATHY OF SACRAL REGION: ICD-10-CM

## 2022-01-14 DIAGNOSIS — R10.9 INTERMITTENT ABDOMINAL PAIN: ICD-10-CM

## 2022-01-14 DIAGNOSIS — Z12.31 SCREENING MAMMOGRAM, ENCOUNTER FOR: ICD-10-CM

## 2022-01-14 DIAGNOSIS — D17.9 ANGIOMYOLIPOMA: ICD-10-CM

## 2022-01-14 DIAGNOSIS — K21.9 GASTROESOPHAGEAL REFLUX DISEASE WITHOUT ESOPHAGITIS: ICD-10-CM

## 2022-01-14 DIAGNOSIS — I10 ESSENTIAL HYPERTENSION: Primary | ICD-10-CM

## 2022-01-14 DIAGNOSIS — M48.061 SPINAL STENOSIS OF LUMBAR REGION, UNSPECIFIED WHETHER NEUROGENIC CLAUDICATION PRESENT: ICD-10-CM

## 2022-01-14 DIAGNOSIS — B97.7 HPV (HUMAN PAPILLOMA VIRUS) INFECTION: ICD-10-CM

## 2022-01-14 DIAGNOSIS — F32.A DEPRESSION, UNSPECIFIED DEPRESSION TYPE: ICD-10-CM

## 2022-01-14 PROCEDURE — 99215 OFFICE O/P EST HI 40 MIN: CPT | Performed by: FAMILY MEDICINE

## 2022-01-14 RX ORDER — ESCITALOPRAM OXALATE 10 MG/1
10 TABLET ORAL DAILY
Qty: 90 TABLET | Refills: 1 | Status: SHIPPED | OUTPATIENT
Start: 2022-01-14 | End: 2022-04-27

## 2022-01-14 RX ORDER — FOSINOPRIL SODIUM 40 MG/1
40 TABLET ORAL DAILY
Qty: 90 TABLET | Refills: 1 | Status: CANCELLED | OUTPATIENT
Start: 2022-01-14

## 2022-01-14 RX ORDER — HYDROCHLOROTHIAZIDE 25 MG/1
25 TABLET ORAL DAILY
Qty: 90 TABLET | Refills: 1 | Status: SHIPPED | OUTPATIENT
Start: 2022-01-14 | End: 2022-07-05

## 2022-01-14 RX ORDER — METHOCARBAMOL 750 MG/1
750 TABLET, FILM COATED ORAL NIGHTLY PRN
Qty: 30 TABLET | Refills: 0 | Status: SHIPPED | OUTPATIENT
Start: 2022-01-14 | End: 2022-06-02

## 2022-01-14 RX ORDER — FOSINOPRIL SODIUM 20 MG/1
20 TABLET ORAL DAILY
Qty: 90 TABLET | Refills: 0 | Status: SHIPPED | OUTPATIENT
Start: 2022-01-14 | End: 2022-04-11

## 2022-01-14 RX ORDER — FAMOTIDINE 20 MG/1
20 TABLET, FILM COATED ORAL 2 TIMES DAILY
Qty: 180 TABLET | Refills: 1 | Status: SHIPPED | OUTPATIENT
Start: 2022-01-14 | End: 2022-07-05

## 2022-01-14 ASSESSMENT — ENCOUNTER SYMPTOMS
COUGH: 0
NAUSEA: 0
SHORTNESS OF BREATH: 0

## 2022-01-14 NOTE — PROGRESS NOTES
Chani Parker (:  1960) is a 57 y.o. female,Established patient, here for evaluation of the following chief complaint(s):  Hypertension (5 month follow up), Depression, and Other         ASSESSMENT/PLAN:  1. Essential hypertension, chronic  -Decrease Fosinopril to 20 mg  -     hydroCHLOROthiazide (HYDRODIURIL) 25 MG tablet; Take 1 tablet by mouth daily, Disp-90 tablet, R-1Normal  -     fosinopril (MONOPRIL) 20 MG tablet; Take 1 tablet by mouth daily, Disp-90 tablet, R-0D/C Fosinopril 40Normal  2. Depression, unspecified depression type, chronic  -     escitalopram (LEXAPRO) 10 MG tablet; Take 1 tablet by mouth daily, Disp-90 tablet, R-1Normal  3. Gastroesophageal reflux disease without esophagitis, chronic  -     famotidine (PEPCID) 20 MG tablet; Take 1 tablet by mouth 2 times daily, Disp-180 tablet, R-1Normal  4. Angiomyolipoma  -     US RETROPERITONEAL LIMITED; Future  5. Radiculopathy of sacral region  6. Spinal stenosis of lumbar region, unspecified whether neurogenic claudication present, chronic  Start:  -     methocarbamol (ROBAXIN-750) 750 MG tablet; Take 1 tablet by mouth nightly as needed (muscle spasm), Disp-30 tablet, R-0Normal  -     External Referral To Pain Clinic- Dr. Ozzy Riggs for injections  ADR's explained  7. HPV (human papilloma virus) infection  -     Lindsay Ge MD, [facility]  8. Intermittent abdominal pain, chronic  -     Amb External Referral To Gastroenterology- Dr. Steve Gomez has seen previously  5. Screening mammogram, encounter for  -     [facility] SKY DIGITAL SCREEN BILATERAL; Future  Persist RTO or call  On this date 2022 I have spent 40 minutes reviewing previous notes, test results and face to face with the patient discussing the diagnosis and importance of compliance with the treatment plan as well as documenting on the day of the visit. Return in about 3 months (around 2022) for HTN.          Subjective   SUBJECTIVE/OBJECTIVE:  HPI: This 58 yo F here for the following  Patient Active Problem List    Diagnosis Date Noted    Cervical spinal stenosis     Gastroesophageal reflux disease without esophagitis 05/26/2021    Hyperlipidemia     Depression     Hypertension     Ataxia 01/02/2019    Right leg weakness 01/02/2019     HTN- Bp has been lower. Pt has lost weight. On Fosinopril 40 and this is causing her BP to go lower  Depression- On Lexapro-stable  GERD- stable on Pepcid  Neurology states she does not have MS. She has degenerative changes of her lumbar spine. Lumbar spinal stenosis and sacral radiculopathy. She is interested in back injections. Neurology started her on Gabapentin, but she stopped the medication. +Muscle spasms   -Using CBD gummies. Pt using medical marijuana  -Mri lumbar noted indeterminate L kidney lesions. Hx of angiomyolipomas  -HPV history per pt. She would like a referral to Gyn  -She c/o of chronic intermittent abdomen pain and would like to see Dr. Pratibha Michael. Last C-scope 2014. +Diverticulosis. No acute pain today      Review of Systems   Constitutional: Negative for diaphoresis and fever. Respiratory: Negative for cough and shortness of breath. Cardiovascular: Negative for chest pain and palpitations. Gastrointestinal: Positive for abdominal pain (interrmittent). Negative for constipation, diarrhea and nausea. Genitourinary: Negative for difficulty urinating. Musculoskeletal: Positive for back pain. Neurological: Negative for dizziness and headaches. Psychiatric/Behavioral: Negative for dysphoric mood.        No Known Allergies  Current Outpatient Medications   Medication Sig Dispense Refill    escitalopram (LEXAPRO) 10 MG tablet Take 1 tablet by mouth daily 90 tablet 1    famotidine (PEPCID) 20 MG tablet Take 1 tablet by mouth 2 times daily 180 tablet 1    hydroCHLOROthiazide (HYDRODIURIL) 25 MG tablet Take 1 tablet by mouth daily 90 tablet 1    methocarbamol (ROBAXIN-750) 750 MG tablet Take 1 tablet by mouth nightly as needed (muscle spasm) 30 tablet 0    fosinopril (MONOPRIL) 20 MG tablet Take 1 tablet by mouth daily 90 tablet 0    aspirin 81 MG EC tablet Take 1 tablet by mouth daily 30 tablet 0     No current facility-administered medications for this visit. Vitals:    01/14/22 0831   BP: 110/74   Site: Left Upper Arm   Position: Sitting   Cuff Size: Medium Adult   Pulse: 92   Temp: 97.1 °F (36.2 °C)   SpO2: 96%   Weight: 162 lb 6.4 oz (73.7 kg)   Height: 5' 7\" (1.702 m)     Objective   Physical Exam  Vitals reviewed. Constitutional:       General: She is not in acute distress. Eyes:      Extraocular Movements: Extraocular movements intact. Conjunctiva/sclera: Conjunctivae normal.   Cardiovascular:      Rate and Rhythm: Normal rate and regular rhythm. Pulmonary:      Effort: Pulmonary effort is normal. No respiratory distress. Breath sounds: Normal breath sounds. Abdominal:      General: Bowel sounds are normal.      Palpations: Abdomen is soft. Tenderness: There is no abdominal tenderness. Musculoskeletal:      Cervical back: Neck supple. Right lower leg: No edema. Left lower leg: No edema. Neurological:      Mental Status: She is alert and oriented to person, place, and time. Psychiatric:         Mood and Affect: Mood normal.                An electronic signature was used to authenticate this note.     --Jocelyn Castro DO

## 2022-01-16 ASSESSMENT — ENCOUNTER SYMPTOMS
ABDOMINAL PAIN: 1
BACK PAIN: 1
CONSTIPATION: 0
DIARRHEA: 0

## 2022-01-17 ENCOUNTER — TELEPHONE (OUTPATIENT)
Dept: INTERNAL MEDICINE CLINIC | Age: 62
End: 2022-01-17

## 2022-01-17 NOTE — TELEPHONE ENCOUNTER
Referral to Dr. Kerin Babinski faxed this date. Referral to Dr. [facility] faxed this date.
- - -

## 2022-01-25 ENCOUNTER — HOSPITAL ENCOUNTER (OUTPATIENT)
Dept: ULTRASOUND IMAGING | Age: 62
Discharge: HOME | End: 2022-01-25
Payer: COMMERCIAL

## 2022-01-25 DIAGNOSIS — D17.9 ANGIOMYOLIPOMA: ICD-10-CM

## 2022-01-25 PROCEDURE — 76775 US EXAM ABDO BACK WALL LIM: CPT

## 2022-01-26 ENCOUNTER — HOSPITAL ENCOUNTER (OUTPATIENT)
Age: 62
Setting detail: SPECIMEN
Discharge: HOME | End: 2022-01-26
Payer: COMMERCIAL

## 2022-01-26 ENCOUNTER — INITIAL CONSULT (OUTPATIENT)
Dept: OBGYN | Age: 62
End: 2022-01-26
Payer: COMMERCIAL

## 2022-01-26 VITALS
DIASTOLIC BLOOD PRESSURE: 73 MMHG | BODY MASS INDEX: 26.21 KG/M2 | SYSTOLIC BLOOD PRESSURE: 111 MMHG | WEIGHT: 167 LBS | HEIGHT: 67 IN

## 2022-01-26 DIAGNOSIS — Z01.419 WOMEN'S ANNUAL ROUTINE GYNECOLOGICAL EXAMINATION: Primary | ICD-10-CM

## 2022-01-26 PROCEDURE — 99386 PREV VISIT NEW AGE 40-64: CPT

## 2022-01-26 PROCEDURE — 88142 CYTOPATH C/V THIN LAYER: CPT

## 2022-01-26 ASSESSMENT — ENCOUNTER SYMPTOMS
ABDOMINAL PAIN: 0
GASTROINTESTINAL NEGATIVE: 1
RESPIRATORY NEGATIVE: 1

## 2022-01-26 NOTE — PROGRESS NOTES
22    [facility]  1960    Chief Complaint   Patient presents with    Gynecologic Exam     postmenopausal, no HRT, is not sexually active, Last pap smear was  normal, Last mammo was  normal, last dexa unknown?, last colonoscopy was  normal.         The patient is a 57 y.o. female, No obstetric history on file. who presents for her annual exam.  She is menopausal.  She is not taking HRT. [address] She is not sexually active. She reports no gynecological symptoms. Pap smear history: Her last PAP smear was in 2019. Her results were normal.    Breast history: her most recent mammogram was in . The results were: Normal    Osteoporosis Status: she has not had a bone density scan    Colonoscopy Status: she had a colonoscopy in . The results were normal.    Past Medical History:   Diagnosis Date    Acid reflux     Bilateral renal cysts     Cervical spinal stenosis     Depression     History of HPV infection     per old chart    Hx of fall 2018    Hyperlipidemia     Hypertension     Weakness of right leg     per old chart pt in ER with c/o weakness right leg, feeling\"needles/pins sensation right hip to right foot\" consult with Dr Lisa Bravo       Past Surgical History:   Procedure Laterality Date    BACK SURGERY      lumbar     CERVIX BIOPSY  2016    cone bx     COLONOSCOPY  last one ?  COLPOSCOPY  2016    TUBAL LIGATION         Family History   Problem Relation Age of Onset    Diabetes Mother     High Blood Pressure Mother     Heart Disease Father        Social History     Tobacco Use    Smoking status: Former Smoker     Packs/day: 0.50     Years: 2.00     Pack years: 1.00     Quit date:      Years since quittin.0    Smokeless tobacco: Never Used    Tobacco comment: last smoked 40 yrs ago    Vaping Use    Vaping Use: Never used   Substance Use Topics    Alcohol use:  Yes     Alcohol/week: 1.0 standard drink     Types: 1 Glasses of wine per week     Comment: weekly/ per pt on 1/28/2019\"2-2 times per week\"    Drug use: No       Current Outpatient Medications   Medication Sig Dispense Refill    medical marijuana Take by mouth as needed.  escitalopram (LEXAPRO) 10 MG tablet Take 1 tablet by mouth daily 90 tablet 1    famotidine (PEPCID) 20 MG tablet Take 1 tablet by mouth 2 times daily 180 tablet 1    hydroCHLOROthiazide (HYDRODIURIL) 25 MG tablet Take 1 tablet by mouth daily 90 tablet 1    fosinopril (MONOPRIL) 20 MG tablet Take 1 tablet by mouth daily 90 tablet 0    aspirin 81 MG EC tablet Take 1 tablet by mouth daily 30 tablet 0    methocarbamol (ROBAXIN-750) 750 MG tablet Take 1 tablet by mouth nightly as needed (muscle spasm) (Patient not taking: Reported on 1/26/2022) 30 tablet 0     No current facility-administered medications for this visit. No Known Allergies    No obstetric history on file. Immunization History   Administered Date(s) Administered    COVID-19, Pfizer Purple top, DILUTE for use, 12+ yrs, 30mcg/0.3mL dose 03/25/2021, 04/22/2021    Influenza Virus Vaccine 12/28/2013, 11/02/2015, 12/04/2017    Tdap (Boostrix, Adacel) 02/23/2013       Review of Systems   Constitutional: Negative. Negative for fatigue and fever. Respiratory: Negative. Gastrointestinal: Negative. Negative for abdominal pain. Endocrine: Negative. Genitourinary: Negative. Negative for dysuria, frequency, menstrual problem, pelvic pain, vaginal bleeding, vaginal discharge and vaginal pain. Musculoskeletal: Negative. Skin: Negative. Neurological: Negative. Negative for dizziness and headaches. Psychiatric/Behavioral: Negative. /73   Ht 5' 7\" (1.702 m)   Wt 167 lb (75.8 kg)   LMP 01/28/2009   BMI 26.16 kg/m²     Physical Exam  Vitals and nursing note reviewed. Constitutional:       General: She is not in acute distress. Appearance: Normal appearance. She is normal weight.    HENT:      Head: Normocephalic and atraumatic. Pulmonary:      Effort: Pulmonary effort is normal. No respiratory distress. Chest:   Breasts:      Right: Normal. No axillary adenopathy or supraclavicular adenopathy. Left: Normal. No axillary adenopathy or supraclavicular adenopathy. Abdominal:      Palpations: Abdomen is soft. Tenderness: There is no abdominal tenderness. Genitourinary:     General: Normal vulva. Exam position: Lithotomy position. Vagina: Normal.      Cervix: Normal.      Uterus: Normal.       Adnexa: Right adnexa normal and left adnexa normal.   Musculoskeletal:         General: Normal range of motion. Cervical back: Normal range of motion. Lymphadenopathy:      Upper Body:      Right upper body: No supraclavicular or axillary adenopathy. Left upper body: No supraclavicular or axillary adenopathy. Skin:     General: Skin is warm and dry. Findings: No rash. Neurological:      General: No focal deficit present. Mental Status: She is alert and oriented to person, place, and time. Psychiatric:         Mood and Affect: Mood normal.         Behavior: Behavior normal.         Thought Content: Thought content normal.         No results found for this visit on 01/26/22. Assessment and Plan   Diagnosis Orders   1. Women's annual routine gynecological examination  PAP SMEAR     Pt states she already has mammogram scheduled, also has upcoming colonoscopy. Pap today.     Return in about 1 year (around 1/26/2023) for annual.    Pernell Flores PA-C

## 2022-02-07 ENCOUNTER — HOSPITAL ENCOUNTER (OUTPATIENT)
Dept: WOMENS IMAGING | Age: 62
Discharge: HOME | End: 2022-02-07
Payer: COMMERCIAL

## 2022-02-07 DIAGNOSIS — Z12.31 SCREENING MAMMOGRAM, ENCOUNTER FOR: ICD-10-CM

## 2022-02-07 PROCEDURE — 77063 BREAST TOMOSYNTHESIS BI: CPT

## 2022-04-09 DIAGNOSIS — I10 ESSENTIAL HYPERTENSION: ICD-10-CM

## 2022-04-11 RX ORDER — FOSINOPRIL SODIUM 20 MG/1
TABLET ORAL
Qty: 90 TABLET | Refills: 0 | Status: SHIPPED | OUTPATIENT
Start: 2022-04-11 | End: 2022-07-06

## 2022-04-21 ENCOUNTER — OFFICE VISIT (OUTPATIENT)
Dept: FAMILY MEDICINE CLINIC | Age: 62
End: 2022-04-21
Payer: COMMERCIAL

## 2022-04-21 ENCOUNTER — HOSPITAL ENCOUNTER (OUTPATIENT)
Age: 62
Discharge: HOME | End: 2022-04-21
Payer: COMMERCIAL

## 2022-04-21 ENCOUNTER — HOSPITAL ENCOUNTER (OUTPATIENT)
Age: 62
Setting detail: SPECIMEN
Discharge: HOME | End: 2022-04-21
Payer: COMMERCIAL

## 2022-04-21 VITALS
RESPIRATION RATE: 12 BRPM | HEART RATE: 77 BPM | OXYGEN SATURATION: 97 % | TEMPERATURE: 97.3 F | BODY MASS INDEX: 24.97 KG/M2 | WEIGHT: 159.4 LBS

## 2022-04-21 DIAGNOSIS — R51.9 GENERALIZED HEADACHE: ICD-10-CM

## 2022-04-21 DIAGNOSIS — R53.83 FATIGUE, UNSPECIFIED TYPE: ICD-10-CM

## 2022-04-21 DIAGNOSIS — R11.2 NAUSEA AND VOMITING IN ADULT: ICD-10-CM

## 2022-04-21 DIAGNOSIS — R68.83 CHILLS (WITHOUT FEVER): Primary | ICD-10-CM

## 2022-04-21 LAB
ALBUMIN SERPL-MCNC: 4.7 GM/DL (ref 3.4–5)
ALP BLD-CCNC: 56 IU/L (ref 40–128)
ALT SERPL-CCNC: 14 U/L (ref 10–40)
ANION GAP SERPL CALCULATED.3IONS-SCNC: 16 MMOL/L (ref 4–16)
AST SERPL-CCNC: 15 IU/L (ref 15–37)
BASOPHILS ABSOLUTE: 0 K/CU MM
BASOPHILS RELATIVE PERCENT: 0.5 % (ref 0–1)
BILIRUB SERPL-MCNC: 0.6 MG/DL (ref 0–1)
BUN BLDV-MCNC: 16 MG/DL (ref 6–23)
CALCIUM SERPL-MCNC: 9.5 MG/DL (ref 8.3–10.6)
CHLORIDE BLD-SCNC: 96 MMOL/L (ref 99–110)
CO2: 24 MMOL/L (ref 21–32)
CREAT SERPL-MCNC: 0.6 MG/DL (ref 0.6–1.1)
DIFFERENTIAL TYPE: ABNORMAL
EOSINOPHILS ABSOLUTE: 0 K/CU MM
EOSINOPHILS RELATIVE PERCENT: 0.6 % (ref 0–3)
GFR AFRICAN AMERICAN: >60 ML/MIN/1.73M2
GFR NON-AFRICAN AMERICAN: >60 ML/MIN/1.73M2
GLUCOSE BLD-MCNC: 89 MG/DL (ref 70–99)
HCT VFR BLD CALC: 48.8 % (ref 37–47)
HEMOGLOBIN: 15.4 GM/DL (ref 12.5–16)
IMMATURE NEUTROPHIL %: 0.3 % (ref 0–0.43)
LYMPHOCYTES ABSOLUTE: 1 K/CU MM
LYMPHOCYTES RELATIVE PERCENT: 16.2 % (ref 24–44)
MCH RBC QN AUTO: 31 PG (ref 27–31)
MCHC RBC AUTO-ENTMCNC: 31.6 % (ref 32–36)
MCV RBC AUTO: 98.4 FL (ref 78–100)
MONOCYTES ABSOLUTE: 0.5 K/CU MM
MONOCYTES RELATIVE PERCENT: 7.8 % (ref 0–4)
NUCLEATED RBC %: 0 %
PDW BLD-RTO: 12.2 % (ref 11.7–14.9)
PLATELET # BLD: 243 K/CU MM (ref 140–440)
PMV BLD AUTO: 10.4 FL (ref 7.5–11.1)
POTASSIUM SERPL-SCNC: 3.8 MMOL/L (ref 3.5–5.1)
RBC # BLD: 4.96 M/CU MM (ref 4.2–5.4)
SARS-COV-2: NOT DETECTED
SEGMENTED NEUTROPHILS ABSOLUTE COUNT: 4.7 K/CU MM
SEGMENTED NEUTROPHILS RELATIVE PERCENT: 74.6 % (ref 36–66)
SODIUM BLD-SCNC: 136 MMOL/L (ref 135–145)
SOURCE: NORMAL
TOTAL IMMATURE NEUTOROPHIL: 0.02 K/CU MM
TOTAL NUCLEATED RBC: 0 K/CU MM
TOTAL PROTEIN: 7 GM/DL (ref 6.4–8.2)
WBC # BLD: 6.3 K/CU MM (ref 4–10.5)

## 2022-04-21 PROCEDURE — U0003 INFECTIOUS AGENT DETECTION BY NUCLEIC ACID (DNA OR RNA); SEVERE ACUTE RESPIRATORY SYNDROME CORONAVIRUS 2 (SARS-COV-2) (CORONAVIRUS DISEASE [COVID-19]), AMPLIFIED PROBE TECHNIQUE, MAKING USE OF HIGH THROUGHPUT TECHNOLOGIES AS DESCRIBED BY CMS-2020-01-R: HCPCS

## 2022-04-21 PROCEDURE — 85025 COMPLETE CBC W/AUTO DIFF WBC: CPT

## 2022-04-21 PROCEDURE — 36415 COLL VENOUS BLD VENIPUNCTURE: CPT

## 2022-04-21 PROCEDURE — U0005 INFEC AGEN DETEC AMPLI PROBE: HCPCS

## 2022-04-21 PROCEDURE — 99213 OFFICE O/P EST LOW 20 MIN: CPT | Performed by: PHYSICIAN ASSISTANT

## 2022-04-21 PROCEDURE — 80053 COMPREHEN METABOLIC PANEL: CPT

## 2022-04-21 RX ORDER — ONDANSETRON 8 MG/1
8 TABLET, ORALLY DISINTEGRATING ORAL EVERY 8 HOURS PRN
Qty: 15 TABLET | Refills: 0 | Status: SHIPPED | OUTPATIENT
Start: 2022-04-21 | End: 2022-06-02

## 2022-04-21 NOTE — PROGRESS NOTES
4/21/22  Ashley Parker  1960    FLU/COVID-19 CLINIC EVALUATION    HPI SYMPTOMS:    Employer:[employer]    [] Fevers  [x] Chills  [] Cough  [] Coughing up blood  [] Chest Congestion  [] Nasal Congestion  [] Feeling short of breath  [] Sometimes  [] Frequently  [] All the time  [] Chest pain  [x] Headaches  [x]Tolerable  [] Severe  [] Sore throat  [] Muscle aches  [x] Nausea  [] Vomiting  []Unable to keep fluids down  [] Diarrhea  []Severe    [x] OTHER SYMPTOMS:    Fatigue    Symptom Duration:   [] 1  [] 2   [] 3   [] 4    [] 5   [] 6   [x] 7   [] 8   [] 9   [] 10   [] 11   [] 12   [] 13   [] 14   [] Longer than 14 days    Symptom course:   [x] Worsening     [] Stable     [] Improving    RISK FACTORS:    [] Pregnant or possibly pregnant  [x] Age over 61  [] Diabetes  [] Heart disease  [] Asthma  [] COPD/Other chronic lung diseases  [] Active Cancer  [] On Chemotherapy  [] Taking oral steroids  [] History Lymphoma/Leukemia  [] Close contact with a lab confirmed COVID-19 patient within 14 days of symptom onset  [x] History of travel from affected geographical areas within 14 days of symptom onset-[place]     VITALS:  There were no vitals filed for this visit. TESTS:    POCT FLU:  [] Positive     []Negative    ASSESSMENT:    [] Flu  [] Possible COVID-19  [] Strep    PLAN:    [] Discharge home with written instructions for:  [] Flu management  [] Possible COVID-19 infection with self-quarantine and management of symptoms  [] Follow-up with primary care physician or emergency department if worsens  [] Evaluation per physician/NP/PA in clinic  [] Sent to ER       An  electronic signature was used to authenticate this note.      --Ryan Wyatt on 4/21/2022 at 9:42 AM

## 2022-04-21 NOTE — PROGRESS NOTES
4/21/2022    Agustina Dave [facility]    Chief Complaint   Patient presents with    Chills    Nausea    Fatigue       HPI  History was obtained from patient. Joann Bazan is a 57 y.o. female who presents today with complaints of 7-day history of intermittent chills, mild generalized headaches, intermittent nausea and fatigue. She states 4 days ago, she had a few episodes of emesis that contained only phlegm. This only lasted that day. She has had a burning sensation in her throat, states this is typical with her GERD. She typically takes Prilosec 20 mg evening and night. Since current illness, she has only been taking the medication in the morning. She denies abdominal pain, chest pain, diarrhea, constipation, melena, hematochezia, hematemesis, loss of taste or smell. She denies sore throat, nasal congestion, cough or fevers. She states she does have postnasal drip, contributes this to typical allergies. She states the fatigue is worse of all. She just feels really ran down, has not been able to perform her job duties as she usually does. No known history of esophageal or gastric ulcers. No known history of anemia. No excessive Tylenol, NSAIDs, alcohol consumption. She reports good p.o. intake and urine output. She drank coffee and water this morning. No known ill contacts. She does admit recent travel to [place] within the last 14 days. She is vaccinated against COVID-19 with 2 doses of Pfizer. She tested positive for the virus in December of last year. She is employed by [employer].       PAST MEDICAL HISTORY  Past Medical History:   Diagnosis Date    Acid reflux     Bilateral renal cysts     Cervical spinal stenosis     Depression     History of HPV infection     per old chart    Hx of fall 12/31/2018    Hyperlipidemia     Hypertension     Weakness of right leg     per old chart pt in ER with c/o weakness right leg, feeling\"needles/pins sensation right hip to right foot\" consult with  Not on file   Intimate Partner Violence:     Fear of Current or Ex-Partner: Not on file    Emotionally Abused: Not on file    Physically Abused: Not on file    Sexually Abused: Not on file   Housing Stability:     Unable to Pay for Housing in the Last Year: Not on file    Number of [place] in the Last Year: Not on file    Unstable Housing in the Last Year: Not on file        SURGICAL HISTORY  Past Surgical History:   Procedure Laterality Date    BACK SURGERY  1992    lumbar     CERVIX BIOPSY  04/2016    cone bx     COLONOSCOPY  last one 2013?  COLPOSCOPY  03/2016    TUBAL LIGATION  1990       CURRENT MEDICATIONS  Current Outpatient Medications   Medication Sig Dispense Refill    ondansetron (ZOFRAN-ODT) 8 MG TBDP disintegrating tablet Place 1 tablet under the tongue every 8 hours as needed for Nausea or Vomiting 15 tablet 0    fosinopril (MONOPRIL) 20 MG tablet Take 1 tablet by mouth once daily 90 tablet 0    medical marijuana Take by mouth as needed.  escitalopram (LEXAPRO) 10 MG tablet Take 1 tablet by mouth daily 90 tablet 1    famotidine (PEPCID) 20 MG tablet Take 1 tablet by mouth 2 times daily 180 tablet 1    hydroCHLOROthiazide (HYDRODIURIL) 25 MG tablet Take 1 tablet by mouth daily 90 tablet 1    methocarbamol (ROBAXIN-750) 750 MG tablet Take 1 tablet by mouth nightly as needed (muscle spasm) (Patient not taking: Reported on 1/26/2022) 30 tablet 0    aspirin 81 MG EC tablet Take 1 tablet by mouth daily 30 tablet 0     No current facility-administered medications for this visit. ALLERGIES  No Known Allergies    PHYSICAL EXAM    Pulse 77   Temp 97.3 °F (36.3 °C)   Resp 12   Wt 159 lb 6.4 oz (72.3 kg)   LMP 01/28/2009   SpO2 97%   BMI 24.97 kg/m²     Constitutional:  Well developed, well nourished  HENT:  Normocephalic, atraumatic, bilateral external ears normal, bilateral ear canals with hard appearing cerumen. Top of TMs visible without fluid or infection. Oropharynx moist.  No petechiae or exudate. Uvula midline and benign and airway patent. Mild nasal congestion. Eyes:  conjunctiva normal, no discharge, no scleral icterus  Neck:  No tenderness, supple  Lymphatic:  No lymphadenopathy noted  Cardiovascular:  Normal heart rate, normal rhythm, no murmurs, gallops or rubs  Thorax & Lungs:  Normal breath sounds, no respiratory distress, no wheezing, no rales, no rhonchi  Skin:  Warm, dry, no erythema, no rash, normal turgor. Neurologic:  Alert & oriented   Psychiatric:  Affect normal, mood normal    ASSESSMENT & PLAN    Beata Reynolds was seen today for chills, nausea and fatigue. Diagnoses and all orders for this visit:    Chills (without fever)  -     Covid-19 Ambulatory    Nausea and vomiting in adult  -     Covid-19 Ambulatory  -     ondansetron (ZOFRAN-ODT) 8 MG TBDP disintegrating tablet; Place 1 tablet under the tongue every 8 hours as needed for Nausea or Vomiting    Generalized headache  -     Covid-19 Ambulatory    Fatigue, unspecified type  -     Covid-19 Ambulatory  -     Comprehensive Metabolic Panel; Future  -     CBC with Auto Differential; Future        Discussed likely viral gastroenteritis due to what is circulating in the community right now. Symptoms do seem to be lasting longer than typical course of illness. We will check CBC, CMP  Rest  Drink small amounts of clear fluids frequently  [place] diet and advance as tolerated  Zofran as needed for nausea/vomiting  Go back to normal dose of Pepcid which is 20 mg twice daily  Saline nasal spray as needed for nasal congestion  Monitor temperature twice a day  Tylenol as needed for fevers and/or discomfort. If symptoms worsen -Go to the ER. Follow up with your primary care provider    There are no discontinued medications. No follow-ups on file. Patient verbalizes understanding with the above plan and is in agreement. Patient will call with  questions or concerns.     I did don appropriate PPE (including N95 face mask, protective safety glasses, face shield, gloves, and gown) as recommended by the health facility/national standard best practice, during my interaction with the patient. Please note that this chart was generated using dragon dictation software. Although every effort was made to ensure the accuracy of this automated transcription, some errors in transcription may have occurred.     Electronically signed by Sonam De León PA-C on 4/21/2022

## 2022-04-21 NOTE — PATIENT INSTRUCTIONS
Your COVID 19 test can take 1-5 days for the results to come back. We ask that you make a Mychart page and view your test results this way. If positive, please work on contact tracing. Obtain blood work  Rest  Drink small amounts of clear fluids frequently  [employer] and advance as tolerated  Zofran as needed for nausea/vomiting  Increase Pepcid to 40 mg daily for one week  Saline nasal spray as needed for nasal congestion  Monitor temperature twice a day  Tylenol as needed for fevers and/or discomfort. If symptoms worsen -Go to the ER. Follow up with your primary care provider      To Whom it May Concern:    Jenny Hayden was seen in our clinic 04/21/2022. Please excuse any absences from work this week. Rola Craig PA-C            Patient Education        Viral Infections: Care Instructions  Overview     You don't feel well, but it's not clear what's causing it. You may have a viral infection. Viruses cause many illnesses, such as the common cold, influenza, fever, rashes, and the diarrhea, nausea, and vomiting that are symptoms of a stomach infection. You may wonder if antibiotic medicines could make you feel better. But antibiotics only treat infections caused by bacteria. They don'twork on viruses. The good news is that viral infections usually aren't serious. Most will go away in a few days without medical treatment. In the meantime, there are a fewthings you can do to make yourself more comfortable. Follow-up care is a key part of your treatment and safety. Be sure to make and go to all appointments, and call your doctor if you are having problems. It's also a good idea to know your test results and keep alist of the medicines you take. How can you care for yourself at home?  Get plenty of rest if you feel tired.  Take an over-the-counter pain medicine if needed, such as acetaminophen (Tylenol), ibuprofen (Advil, Motrin), or naproxen (Aleve).  Read and follow all instructions on the label.  Be careful when taking over-the-counter cold or flu medicines and Tylenol at the same time. Many of these medicines have acetaminophen, which is Tylenol. Read the labels to make sure that you are not taking more than the recommended dose. Too much acetaminophen (Tylenol) can be harmful.  Drink plenty of fluids. If you have kidney, heart, or liver disease and have to limit fluids, talk with your doctor before you increase the amount of fluids you drink.  Stay home from work, school, and other public places while you have a fever. When should you call for help? Call 911 anytime you think you may need emergency care. For example, call if:     You have severe trouble breathing.      You passed out (lost consciousness). Call your doctor now or seek immediate medical care if:     You seem to be getting much sicker.      You have a new or higher fever.      You have blood in your stools.      You have new belly pain, or your pain gets worse.      You have a new rash. Watch closely for changes in your health, and be sure to contact your doctor if:     You start to get better and then get worse.      You do not get better as expected. Where can you learn more? Go to https://Mgv.[employer]. org and sign in to your [employer] account. Enter U193 in the [facility] box to learn more about \"Viral Infections: Care Instructions. \"     If you do not have an account, please click on the \"Sign Up Now\" link. Current as of: July 1, 2021               Content Version: 13.2  © 2006-2022 Healthwise, Incorporated. Care instructions adapted under license by [facility] ([facility]). If you have questions about a medical condition or this instruction, always ask your healthcare professional. [place] [address] any warranty or liability for your use of this information.

## 2022-04-27 ENCOUNTER — OFFICE VISIT (OUTPATIENT)
Dept: INTERNAL MEDICINE CLINIC | Age: 62
End: 2022-04-27
Payer: COMMERCIAL

## 2022-04-27 VITALS
WEIGHT: 159 LBS | BODY MASS INDEX: 24.9 KG/M2 | HEART RATE: 73 BPM | SYSTOLIC BLOOD PRESSURE: 130 MMHG | DIASTOLIC BLOOD PRESSURE: 72 MMHG | OXYGEN SATURATION: 97 %

## 2022-04-27 DIAGNOSIS — F33.1 MODERATE EPISODE OF RECURRENT MAJOR DEPRESSIVE DISORDER (HCC): ICD-10-CM

## 2022-04-27 DIAGNOSIS — M48.061 SPINAL STENOSIS OF LUMBAR REGION, UNSPECIFIED WHETHER NEUROGENIC CLAUDICATION PRESENT: ICD-10-CM

## 2022-04-27 DIAGNOSIS — E78.2 MIXED HYPERLIPIDEMIA: ICD-10-CM

## 2022-04-27 DIAGNOSIS — R10.11 RUQ PAIN: ICD-10-CM

## 2022-04-27 DIAGNOSIS — M54.17 LUMBOSACRAL RADICULOPATHY AT S1: Primary | ICD-10-CM

## 2022-04-27 DIAGNOSIS — I10 ESSENTIAL HYPERTENSION: ICD-10-CM

## 2022-04-27 PROCEDURE — 99214 OFFICE O/P EST MOD 30 MIN: CPT | Performed by: FAMILY MEDICINE

## 2022-04-27 RX ORDER — DULOXETIN HYDROCHLORIDE 30 MG/1
30 CAPSULE, DELAYED RELEASE ORAL DAILY
Qty: 30 CAPSULE | Refills: 0 | Status: SHIPPED | OUTPATIENT
Start: 2022-04-27 | End: 2022-05-20 | Stop reason: SDUPTHER

## 2022-04-27 ASSESSMENT — PATIENT HEALTH QUESTIONNAIRE - PHQ9
3. TROUBLE FALLING OR STAYING ASLEEP: 3
SUM OF ALL RESPONSES TO PHQ QUESTIONS 1-9: 23
7. TROUBLE CONCENTRATING ON THINGS, SUCH AS READING THE NEWSPAPER OR WATCHING TELEVISION: 3
4. FEELING TIRED OR HAVING LITTLE ENERGY: 3
2. FEELING DOWN, DEPRESSED OR HOPELESS: 3
8. MOVING OR SPEAKING SO SLOWLY THAT OTHER PEOPLE COULD HAVE NOTICED. OR THE OPPOSITE, BEING SO FIGETY OR RESTLESS THAT YOU HAVE BEEN MOVING AROUND A LOT MORE THAN USUAL: 2
6. FEELING BAD ABOUT YOURSELF - OR THAT YOU ARE A FAILURE OR HAVE LET YOURSELF OR YOUR FAMILY DOWN: 3
SUM OF ALL RESPONSES TO PHQ QUESTIONS 1-9: 23
10. IF YOU CHECKED OFF ANY PROBLEMS, HOW DIFFICULT HAVE THESE PROBLEMS MADE IT FOR YOU TO DO YOUR WORK, TAKE CARE OF THINGS AT HOME, OR GET ALONG WITH OTHER PEOPLE: 2
5. POOR APPETITE OR OVEREATING: 3
9. THOUGHTS THAT YOU WOULD BE BETTER OFF DEAD, OR OF HURTING YOURSELF: 0
SUM OF ALL RESPONSES TO PHQ9 QUESTIONS 1 & 2: 6
SUM OF ALL RESPONSES TO PHQ QUESTIONS 1-9: 23
1. LITTLE INTEREST OR PLEASURE IN DOING THINGS: 3
SUM OF ALL RESPONSES TO PHQ QUESTIONS 1-9: 23

## 2022-04-27 ASSESSMENT — ENCOUNTER SYMPTOMS
SHORTNESS OF BREATH: 0
BLOOD IN STOOL: 0
BACK PAIN: 0
NAUSEA: 0
DIARRHEA: 0
COUGH: 0
CONSTIPATION: 0

## 2022-04-27 ASSESSMENT — COLUMBIA-SUICIDE SEVERITY RATING SCALE - C-SSRS
2. HAVE YOU ACTUALLY HAD ANY THOUGHTS OF KILLING YOURSELF?: NO
1. WITHIN THE PAST MONTH, HAVE YOU WISHED YOU WERE DEAD OR WISHED YOU COULD GO TO SLEEP AND NOT WAKE UP?: YES
6. HAVE YOU EVER DONE ANYTHING, STARTED TO DO ANYTHING, OR PREPARED TO DO ANYTHING TO END YOUR LIFE?: NO
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
## 2022-04-27 NOTE — LETTER
[address] [place] [address] Internal and Family Medicine  [address] [place] [address]  Phone: [phone]  Fax: [phone]    Bassem January,         April 27, 2022     Patient: Brenabe Nair   YOB: 1960   Date of Visit: 4/27/2022       To Whom it May Concern:    Redd Arita was seen in my clinic on 4/27/2022. She may return to work on 4/28/22. If you have any questions or concerns, please don't hesitate to call.     Sincerely,         Dr. Bassem Keene

## 2022-05-01 ASSESSMENT — ENCOUNTER SYMPTOMS: ABDOMINAL PAIN: 1

## 2022-05-12 ENCOUNTER — HOSPITAL ENCOUNTER (OUTPATIENT)
Dept: ULTRASOUND IMAGING | Age: 62
Discharge: HOME | End: 2022-05-12
Payer: COMMERCIAL

## 2022-05-12 DIAGNOSIS — R10.11 RUQ PAIN: ICD-10-CM

## 2022-05-12 PROCEDURE — 76705 ECHO EXAM OF ABDOMEN: CPT

## 2022-05-20 DIAGNOSIS — F33.1 MODERATE EPISODE OF RECURRENT MAJOR DEPRESSIVE DISORDER (HCC): ICD-10-CM

## 2022-05-20 RX ORDER — DULOXETIN HYDROCHLORIDE 30 MG/1
30 CAPSULE, DELAYED RELEASE ORAL DAILY
Qty: 90 CAPSULE | Refills: 0 | Status: SHIPPED | OUTPATIENT
Start: 2022-05-20 | End: 2022-08-29

## 2022-06-02 ENCOUNTER — OFFICE VISIT (OUTPATIENT)
Dept: INTERNAL MEDICINE CLINIC | Age: 62
End: 2022-06-02
Payer: COMMERCIAL

## 2022-06-02 VITALS
SYSTOLIC BLOOD PRESSURE: 132 MMHG | DIASTOLIC BLOOD PRESSURE: 76 MMHG | BODY MASS INDEX: 25.9 KG/M2 | HEART RATE: 79 BPM | OXYGEN SATURATION: 96 % | HEIGHT: 67 IN | WEIGHT: 165 LBS

## 2022-06-02 DIAGNOSIS — R30.0 DYSURIA: ICD-10-CM

## 2022-06-02 DIAGNOSIS — N39.0 URINARY TRACT INFECTION WITHOUT HEMATURIA, SITE UNSPECIFIED: Primary | ICD-10-CM

## 2022-06-02 DIAGNOSIS — N39.0 UTI (URINARY TRACT INFECTION), UNCOMPLICATED: ICD-10-CM

## 2022-06-02 LAB
BACTERIA: NORMAL /HPF
BILIRUBIN URINE: NEGATIVE
BILIRUBIN, POC: NEGATIVE
BLOOD URINE, POC: NORMAL
BLOOD, URINE: ABNORMAL
CLARITY, POC: NORMAL
CLARITY: CLEAR
COLOR, POC: NORMAL
COLOR: YELLOW
EPITHELIAL CELLS, UA: 0 /HPF (ref 0–5)
GLUCOSE URINE, POC: NEGATIVE
GLUCOSE URINE: NEGATIVE MG/DL
HYALINE CASTS: 0 /LPF (ref 0–8)
KETONES, POC: NEGATIVE
KETONES, URINE: NEGATIVE MG/DL
LEUKOCYTE EST, POC: NORMAL
LEUKOCYTE ESTERASE, URINE: ABNORMAL
MICROSCOPIC EXAMINATION: YES
NITRITE, POC: NEGATIVE
NITRITE, URINE: NEGATIVE
PH UA: 6.5 (ref 5–8)
PH, POC: 6
PROTEIN UA: NEGATIVE MG/DL
PROTEIN, POC: NEGATIVE
RBC UA: 1 /HPF (ref 0–4)
SPECIFIC GRAVITY UA: 1.01 (ref 1–1.03)
SPECIFIC GRAVITY, POC: 1.01
URINE REFLEX TO CULTURE: ABNORMAL
URINE TYPE: ABNORMAL
UROBILINOGEN, POC: NORMAL
UROBILINOGEN, URINE: 0.2 E.U./DL
WBC UA: 3 /HPF (ref 0–5)

## 2022-06-02 PROCEDURE — 81002 URINALYSIS NONAUTO W/O SCOPE: CPT | Performed by: FAMILY MEDICINE

## 2022-06-02 PROCEDURE — 99213 OFFICE O/P EST LOW 20 MIN: CPT | Performed by: FAMILY MEDICINE

## 2022-06-02 RX ORDER — NITROFURANTOIN 25; 75 MG/1; MG/1
100 CAPSULE ORAL 2 TIMES DAILY
Qty: 10 CAPSULE | Refills: 0 | Status: SHIPPED | OUTPATIENT
Start: 2022-06-02 | End: 2022-06-07

## 2022-06-02 SDOH — ECONOMIC STABILITY: FOOD INSECURITY: WITHIN THE PAST 12 MONTHS, YOU WORRIED THAT YOUR FOOD WOULD RUN OUT BEFORE YOU GOT MONEY TO BUY MORE.: NEVER TRUE

## 2022-06-02 SDOH — ECONOMIC STABILITY: FOOD INSECURITY: WITHIN THE PAST 12 MONTHS, THE FOOD YOU BOUGHT JUST DIDN'T LAST AND YOU DIDN'T HAVE MONEY TO GET MORE.: NEVER TRUE

## 2022-06-02 ASSESSMENT — SOCIAL DETERMINANTS OF HEALTH (SDOH): HOW HARD IS IT FOR YOU TO PAY FOR THE VERY BASICS LIKE FOOD, HOUSING, MEDICAL CARE, AND HEATING?: NOT HARD AT ALL

## 2022-06-02 NOTE — PROGRESS NOTES
Subjective:      David Haynes is a 60 y.o. female who complains of dysuria for 7 days. Patient also complains of back pain. Patient denies fever. Patient does not have a history of recurrent UTI. Patient does not have a history of pyelonephritis. Past Medical History:   Diagnosis Date    Acid reflux     Bilateral renal cysts     Cervical spinal stenosis     Depression     History of HPV infection     per old chart    Hx of fall 2018    Hyperlipidemia     Hypertension     Weakness of right leg     per old chart pt in ER with c/o weakness right leg, feeling\"needles/pins sensation right hip to right foot\" consult with Dr Nancy Bonilla     Patient Active Problem List    Diagnosis Date Noted    Cervical spinal stenosis     Gastroesophageal reflux disease without esophagitis 2021    Hyperlipidemia     Depression     Hypertension     Ataxia 2019    Right leg weakness 2019     Social History     Socioeconomic History    Marital status:      Spouse name: None    Number of children: 2    Years of education: None    Highest education level: None   Occupational History     Employer: [employer]   Tobacco Use    Smoking status: Former Smoker     Packs/day: 0.50     Years: 2.00     Pack years: 1.00     Quit date:      Years since quittin.4    Smokeless tobacco: Never Used    Tobacco comment: last smoked 40 yrs ago    Vaping Use    Vaping Use: Never used   Substance and Sexual Activity    Alcohol use:  Yes     Alcohol/week: 1.0 standard drink     Types: 1 Glasses of wine per week     Comment: weekly/ per pt on 2019\"2-2 times per week\"    Drug use: No    Sexual activity: Yes     Partners: Male   Other Topics Concern    None   Social History Narrative    None     Social Determinants of Health     Financial Resource Strain: Low Risk     Difficulty of Paying Living Expenses: Not hard at all   Food Insecurity: No Food Insecurity    Worried About Running Out of Food in the Last Year: Never true    Ran Out of Food in the Last Year: Never true   Transportation Needs:     Lack of Transportation (Medical): Not on file    Lack of Transportation (Non-Medical): Not on file   Physical Activity:     Days of Exercise per Week: Not on file    Minutes of Exercise per Session: Not on file   Stress:     Feeling of Stress : Not on file   Social Connections:     Frequency of Communication with Friends and Family: Not on file    Frequency of Social Gatherings with Friends and Family: Not on file    Attends Mu-ism Services: Not on file    Active Member of [address] [place] [address] [employer] or Organizations: Not on file    Attends Club or Organization Meetings: Not on file    Marital Status: Not on file   Intimate Partner Violence:     Fear of Current or Ex-Partner: Not on file    Emotionally Abused: Not on file    Physically Abused: Not on file    Sexually Abused: Not on file   Housing Stability:     Unable to Pay for Housing in the Last Year: Not on file    Number of [place] in the Last Year: Not on file    Unstable Housing in the Last Year: Not on file     Current Outpatient Medications   Medication Sig Dispense Refill    nitrofurantoin, macrocrystal-monohydrate, (MACROBID) 100 MG capsule Take 1 capsule by mouth 2 times daily for 5 days 10 capsule 0    DULoxetine (CYMBALTA) 30 MG extended release capsule Take 1 capsule by mouth daily 90 capsule 0    fosinopril (MONOPRIL) 20 MG tablet Take 1 tablet by mouth once daily 90 tablet 0    medical marijuana Take by mouth as needed.  famotidine (PEPCID) 20 MG tablet Take 1 tablet by mouth 2 times daily 180 tablet 1    hydroCHLOROthiazide (HYDRODIURIL) 25 MG tablet Take 1 tablet by mouth daily 90 tablet 1    aspirin 81 MG EC tablet Take 1 tablet by mouth daily 30 tablet 0     No current facility-administered medications for this visit.      No Known Allergies  Review of Systems  A comprehensive review of systems was negative except for: Genitourinary: positive for dysuria      Objective:      /76 (Site: Left Upper Arm, Position: Sitting, Cuff Size: Medium Adult)   Pulse 79   Ht 5' 7\" (1.702 m)   Wt 165 lb (74.8 kg)   LMP 01/28/2009   SpO2 96%   BMI 25.84 kg/m²   General: alert, appears stated age and cooperative   Abdomen: soft, nondistended and nontender in the entire abdomen   Back: CVA tenderness absent   : defer exam     Laboratory:   Lab Results   Component Value Date    COLORU Yellow 06/02/2022    LABPH 5.0 06/30/2017    NITRU Negative 06/02/2022    NITRU NEGATIVE 12/27/2013    GLUCOSEU Negative 06/02/2022    GLUCOSEU negative 06/02/2022    KETUA Negative 06/02/2022    KETUA negative 06/02/2022    UROBILINOGEN 0.2 06/02/2022    BILIRUBINUR Negative 06/02/2022    BILIRUBINUR negative 06/02/2022         Assessment:      UTI    Dysuria  Plan:  Plan:     1. Medications: Start  nitrofurantoin  2. Maintain adequate hydration  3. Follow up if symptoms not improving, and prn.       Orders Placed This Encounter   Medications    nitrofurantoin, macrocrystal-monohydrate, (MACROBID) 100 MG capsule     Sig: Take 1 capsule by mouth 2 times daily for 5 days     Dispense:  10 capsule     Refill:  0

## 2022-06-03 ENCOUNTER — TELEPHONE (OUTPATIENT)
Dept: INTERNAL MEDICINE CLINIC | Age: 62
End: 2022-06-03

## 2022-06-03 DIAGNOSIS — R82.90 ABNORMAL URINALYSIS: Primary | ICD-10-CM

## 2022-06-03 NOTE — TELEPHONE ENCOUNTER
[address] [place] [address] and spoke with Juana Copeland in the lab. She confirmed that she rec'd the order for a urine culture.

## 2022-06-04 LAB — URINE CULTURE, ROUTINE: NORMAL

## 2022-07-05 DIAGNOSIS — I10 ESSENTIAL HYPERTENSION: ICD-10-CM

## 2022-07-05 DIAGNOSIS — K21.9 GASTROESOPHAGEAL REFLUX DISEASE WITHOUT ESOPHAGITIS: ICD-10-CM

## 2022-07-05 RX ORDER — FAMOTIDINE 20 MG/1
TABLET, FILM COATED ORAL
Qty: 60 TABLET | Refills: 0 | Status: SHIPPED | OUTPATIENT
Start: 2022-07-05

## 2022-07-05 RX ORDER — HYDROCHLOROTHIAZIDE 25 MG/1
TABLET ORAL
Qty: 30 TABLET | Refills: 0 | Status: SHIPPED | OUTPATIENT
Start: 2022-07-05 | End: 2022-07-27 | Stop reason: SDUPTHER

## 2022-07-06 DIAGNOSIS — I10 ESSENTIAL HYPERTENSION: ICD-10-CM

## 2022-07-06 RX ORDER — FOSINOPRIL SODIUM 20 MG/1
TABLET ORAL
Qty: 90 TABLET | Refills: 0 | Status: SHIPPED | OUTPATIENT
Start: 2022-07-06 | End: 2022-10-25

## 2022-07-27 ENCOUNTER — OFFICE VISIT (OUTPATIENT)
Dept: INTERNAL MEDICINE CLINIC | Age: 62
End: 2022-07-27
Payer: COMMERCIAL

## 2022-07-27 VITALS
HEART RATE: 78 BPM | RESPIRATION RATE: 16 BRPM | DIASTOLIC BLOOD PRESSURE: 80 MMHG | OXYGEN SATURATION: 98 % | SYSTOLIC BLOOD PRESSURE: 110 MMHG | HEIGHT: 67 IN | WEIGHT: 164 LBS | BODY MASS INDEX: 25.74 KG/M2

## 2022-07-27 DIAGNOSIS — K21.9 GASTROESOPHAGEAL REFLUX DISEASE WITHOUT ESOPHAGITIS: ICD-10-CM

## 2022-07-27 DIAGNOSIS — I10 PRIMARY HYPERTENSION: Primary | ICD-10-CM

## 2022-07-27 DIAGNOSIS — H61.21 IMPACTED CERUMEN OF RIGHT EAR: ICD-10-CM

## 2022-07-27 DIAGNOSIS — F32.A DEPRESSION, UNSPECIFIED DEPRESSION TYPE: ICD-10-CM

## 2022-07-27 PROCEDURE — 99213 OFFICE O/P EST LOW 20 MIN: CPT | Performed by: FAMILY MEDICINE

## 2022-07-27 RX ORDER — HYDROCHLOROTHIAZIDE 25 MG/1
TABLET ORAL
Qty: 90 TABLET | Refills: 1 | Status: SHIPPED | OUTPATIENT
Start: 2022-07-27

## 2022-07-27 ASSESSMENT — PATIENT HEALTH QUESTIONNAIRE - PHQ9
SUM OF ALL RESPONSES TO PHQ QUESTIONS 1-9: 0
2. FEELING DOWN, DEPRESSED OR HOPELESS: 0
SUM OF ALL RESPONSES TO PHQ9 QUESTIONS 1 & 2: 0
SUM OF ALL RESPONSES TO PHQ QUESTIONS 1-9: 0
1. LITTLE INTEREST OR PLEASURE IN DOING THINGS: 0

## 2022-07-27 ASSESSMENT — ENCOUNTER SYMPTOMS
NAUSEA: 0
SHORTNESS OF BREATH: 0
COUGH: 0
ABDOMINAL PAIN: 0
BACK PAIN: 0

## 2022-07-27 NOTE — PROGRESS NOTES
Jeanna Parker (:  1960) is a 60 y.o. female,established patient, here for evaluation of the following chief complaint(s):  Depression (Pt reports improvement with the last medication change ), Hypertension, and 3 Month Follow-Up         ASSESSMENT/PLAN:  1. Primary hypertension  Continue fosinopril and HCTZ    2. Gastroesophageal reflux disease without esophagitis  Continue Pepcid    3. Depression, unspecified depression type  Continue Cymbalta 30 at the current dose    4. Impacted cerumen of right ear  - Ear wax removal    Orders Placed This Encounter   Medications    hydroCHLOROthiazide (HYDRODIURIL) 25 MG tablet     Sig: Take 1 tablet by mouth once daily     Dispense:  90 tablet     Refill:  1       Return in about 5 months (around 2022) for Depression, HTN.        Lab Results   Component Value Date    WBC 6.3 2022    HGB 15.4 2022    HCT 48.8 (H) 2022    MCV 98.4 2022     2022       Lab Results   Component Value Date     2022    K 3.8 2022    CL 96 (L) 2022    CO2 24 2022    BUN 16 2022    CREATININE 0.6 2022    GLUCOSE 89 2022    CALCIUM 9.5 2022    PROT 7.0 2022    LABALBU 4.7 2022    BILITOT 0.6 2022    ALKPHOS 56 2022    AST 15 2022    ALT 14 2022    LABGLOM >60 2022    GFRAA >60 2022    AGRATIO 1.9 2021    GLOB 2.5 2021     Lab Results   Component Value Date    TSHFT4 1.42 2021       Subjective   SUBJECTIVE/OBJECTIVE:    HISTORY OF PRESENT ILLNESS:  This is a 60 y.o. female here for the following:  Patient Active Problem List    Diagnosis Date Noted    Cervical spinal stenosis     Gastroesophageal reflux disease without esophagitis 2021    Hyperlipidemia     Depression     Hypertension     Ataxia 2019    Right leg weakness 2019      HTN- stable on fosinopril and HCTZ  GERD- on Pepcid  Depression- stable on Cymbalta 30  Pt to have neck surgery in September with Dr. Carlos Palmer  No history of sleep apnea    Review of Systems   Constitutional:  Negative for diaphoresis and fever. Respiratory:  Negative for cough and shortness of breath. Cardiovascular:  Negative for chest pain and palpitations. Gastrointestinal:  Negative for abdominal pain and nausea. Genitourinary:  Negative for difficulty urinating. Musculoskeletal:  Positive for neck pain. Negative for back pain. Neurological:  Negative for dizziness and headaches. No Known Allergies  Current Outpatient Medications   Medication Sig Dispense Refill    hydroCHLOROthiazide (HYDRODIURIL) 25 MG tablet Take 1 tablet by mouth once daily 90 tablet 1    fosinopril (MONOPRIL) 20 MG tablet Take 1 tablet by mouth once daily 90 tablet 0    famotidine (PEPCID) 20 MG tablet Take 1 tablet by mouth twice daily 60 tablet 0    DULoxetine (CYMBALTA) 30 MG extended release capsule Take 1 capsule by mouth daily 90 capsule 0    medical marijuana Take by mouth as needed. aspirin 81 MG EC tablet Take 1 tablet by mouth daily 30 tablet 0     No current facility-administered medications for this visit. Vitals:    07/27/22 0819   BP: 110/80   Site: Left Upper Arm   Position: Sitting   Cuff Size: Large Adult   Pulse: 78   Resp: 16   SpO2: 98%   Weight: 164 lb (74.4 kg)   Height: 5' 7\" (1.702 m)     Objective   Physical Exam  Vitals reviewed. Constitutional:       General: She is not in acute distress. HENT:      Right Ear: There is impacted cerumen. Left Ear: There is impacted cerumen. Cardiovascular:      Rate and Rhythm: Normal rate and regular rhythm. Pulmonary:      Effort: Pulmonary effort is normal. No respiratory distress. Breath sounds: Normal breath sounds. Abdominal:      Palpations: Abdomen is soft. Tenderness: There is no abdominal tenderness. Musculoskeletal:      Cervical back: Neck supple. Right lower leg: No edema.       Left lower leg: No edema. Neurological:      Mental Status: She is alert and oriented to person, place, and time. Psychiatric:         Mood and Affect: Mood normal.              An electronic signature was used to authenticate this note. --Cinda Coe DO     This dictation was generated by voice recognition computer software. Although all attempts are made to edit the dictation for accuracy, there may be errors in the transcription that are not intended.

## 2022-08-22 NOTE — ED PROVIDER NOTES
Triage Chief Complaint:   Other (left wrist infection/redness)    [place]:  Radha Peña is a 60 y.o. female that presents with infection of left upper extremity. The patient states that she accidentally cut her left thumb on a slicer about 1 week ago and over the last 2 days has noticed some redness around that cut that is extending up the lateral aspect of her wrist.  She has some pain and tenderness in her axillary region as well. Denies any fevers, nausea, vomiting, motor or sensory deficits. She is left-hand dominant. ROS:  At least 10 systems reviewed and otherwise acutely negative except as in the [address] Past Medical History:   Diagnosis Date    Acid reflux     Bilateral renal cysts     Depression     History of HPV infection     per old chart    Hx of fall 12/31/2018    Hyperlipidemia     Hypertension     Weakness of right leg     per old chart pt in ER with c/o weakness right leg, feeling\"needles/pins sensation right hip to right foot\" consult with Dr Sari Wright     Past Surgical History:   Procedure Laterality Date    BACK SURGERY  1992    lumbar     CERVIX BIOPSY  04/2016    cone bx     COLONOSCOPY  last one 2013?     COLPOSCOPY  03/2016    TUBAL LIGATION  1990     Family History   Problem Relation Age of Onset    Diabetes Mother     High Blood Pressure Mother     Heart Disease Father      Social History     Socioeconomic History    Marital status:      Spouse name: Not on file    Number of children: Not on file    Years of education: Not on file    Highest education level: Not on file   Occupational History    Not on file   Social Needs    Financial resource strain: Not on file    Food insecurity:     Worry: Not on file     Inability: Not on file    Transportation needs:     Medical: Not on file     Non-medical: Not on file   Tobacco Use    Smoking status: Former Smoker     Packs/day: 0.50     Years: 2.00     Pack years: 1.00    Smokeless tobacco: Never Used present. If performed, all imaging and lab work also discussed with patient. All relevant prior results and chart reviewed if available. Patient presents as above. Vital signs are normal.  She does not appear systemically ill. She has no neurovascular deficits on exam.  No evidence of flexor tenosynovitis. No evidence of hand or forearm abscess. She does have some lymphatic streaking. Plan to treat with doxycycline, follow-up with PCP by the end of the week for recheck. Patient agreeable with this plan of care. Clinical Impression:  1.  Cellulitis of left upper extremity      (Please note that portions of this note may have been completed with a voice recognition program. Efforts were made to edit the dictations but occasionally words are mis-transcribed.)    MD Dannie Sahu MD  02/19/20 0126 no fever and no chills.

## 2022-08-29 DIAGNOSIS — F33.1 MODERATE EPISODE OF RECURRENT MAJOR DEPRESSIVE DISORDER (HCC): ICD-10-CM

## 2022-08-29 LAB
BUN BLDV-MCNC: 18 MG/DL
CALCIUM SERPL-MCNC: 9.4 MG/DL
CHLORIDE BLD-SCNC: 105 MMOL/L
CO2: 22 MMOL/L
CREAT SERPL-MCNC: 0.84 MG/DL
GFR CALCULATED: 78
GLUCOSE BLD-MCNC: 89 MG/DL
POTASSIUM SERPL-SCNC: 4.2 MMOL/L
SODIUM BLD-SCNC: 144 MMOL/L

## 2022-08-29 RX ORDER — DULOXETIN HYDROCHLORIDE 30 MG/1
CAPSULE, DELAYED RELEASE ORAL
Qty: 90 CAPSULE | Refills: 1 | Status: SHIPPED | OUTPATIENT
Start: 2022-08-29

## 2022-10-25 DIAGNOSIS — I10 ESSENTIAL HYPERTENSION: ICD-10-CM

## 2022-10-25 RX ORDER — FOSINOPRIL SODIUM 20 MG/1
TABLET ORAL
Qty: 90 TABLET | Refills: 0 | Status: SHIPPED | OUTPATIENT
Start: 2022-10-25

## 2022-12-31 DIAGNOSIS — K21.9 GASTROESOPHAGEAL REFLUX DISEASE WITHOUT ESOPHAGITIS: ICD-10-CM

## 2023-01-03 RX ORDER — FAMOTIDINE 20 MG/1
TABLET, FILM COATED ORAL
Qty: 60 TABLET | Refills: 0 | OUTPATIENT
Start: 2023-01-03

## 2023-02-28 DIAGNOSIS — I10 ESSENTIAL HYPERTENSION: ICD-10-CM

## 2023-02-28 RX ORDER — FOSINOPRIL SODIUM 20 MG/1
TABLET ORAL
Qty: 90 TABLET | Refills: 0 | Status: SHIPPED | OUTPATIENT
Start: 2023-02-28

## 2023-02-28 NOTE — TELEPHONE ENCOUNTER
----- Message from Kenan Ramos sent at 2/28/2023  3:30 PM EST -----  Subject: Refill Request    QUESTIONS  Name of Medication? fosinopril (MONOPRIL) 20 MG tablet  Patient-reported dosage and instructions? 20 mg  How many days do you have left? 3  Preferred Pharmacy? [facility]  Pharmacy phone number (if available)? [phone]  ---------------------------------------------------------------------------  --------------  CALL BACK INFO  What is the best way for the office to contact you? OK to leave message on   voicemail  Preferred Call Back Phone Number? [phone]  ---------------------------------------------------------------------------  --------------  SCRIPT ANSWERS  Relationship to Patient?  Self

## 2023-03-07 ENCOUNTER — OFFICE VISIT (OUTPATIENT)
Dept: INTERNAL MEDICINE CLINIC | Age: 63
End: 2023-03-07
Payer: COMMERCIAL

## 2023-03-07 VITALS
WEIGHT: 171 LBS | HEART RATE: 55 BPM | BODY MASS INDEX: 26.84 KG/M2 | SYSTOLIC BLOOD PRESSURE: 128 MMHG | OXYGEN SATURATION: 97 % | HEIGHT: 67 IN | DIASTOLIC BLOOD PRESSURE: 86 MMHG

## 2023-03-07 DIAGNOSIS — J34.89 RHINORRHEA: ICD-10-CM

## 2023-03-07 DIAGNOSIS — K21.9 GASTROESOPHAGEAL REFLUX DISEASE WITHOUT ESOPHAGITIS: ICD-10-CM

## 2023-03-07 DIAGNOSIS — Z98.890 HISTORY OF NECK SURGERY: ICD-10-CM

## 2023-03-07 DIAGNOSIS — F33.1 MODERATE EPISODE OF RECURRENT MAJOR DEPRESSIVE DISORDER (HCC): ICD-10-CM

## 2023-03-07 DIAGNOSIS — E78.2 MIXED HYPERLIPIDEMIA: ICD-10-CM

## 2023-03-07 DIAGNOSIS — Z12.31 SCREENING MAMMOGRAM FOR BREAST CANCER: ICD-10-CM

## 2023-03-07 DIAGNOSIS — L98.9 LESION OF SKIN OF SCALP: ICD-10-CM

## 2023-03-07 DIAGNOSIS — I10 ESSENTIAL HYPERTENSION: Primary | ICD-10-CM

## 2023-03-07 LAB
A/G RATIO: 2 (ref 1.1–2.2)
ALBUMIN SERPL-MCNC: 4.5 G/DL (ref 3.4–5)
ALP BLD-CCNC: 80 U/L (ref 40–129)
ALT SERPL-CCNC: 13 U/L (ref 10–40)
ANION GAP SERPL CALCULATED.3IONS-SCNC: 14 MMOL/L (ref 3–16)
AST SERPL-CCNC: 16 U/L (ref 15–37)
BASOPHILS ABSOLUTE: 0.1 K/UL (ref 0–0.2)
BASOPHILS RELATIVE PERCENT: 1.1 %
BILIRUB SERPL-MCNC: 0.5 MG/DL (ref 0–1)
BUN BLDV-MCNC: 17 MG/DL (ref 7–20)
CALCIUM SERPL-MCNC: 9.5 MG/DL (ref 8.3–10.6)
CHLORIDE BLD-SCNC: 103 MMOL/L (ref 99–110)
CHOLESTEROL, TOTAL: 263 MG/DL (ref 0–199)
CO2: 24 MMOL/L (ref 21–32)
CREAT SERPL-MCNC: 0.6 MG/DL (ref 0.6–1.2)
EOSINOPHILS ABSOLUTE: 0.1 K/UL (ref 0–0.6)
EOSINOPHILS RELATIVE PERCENT: 2.6 %
GFR SERPL CREATININE-BSD FRML MDRD: >60 ML/MIN/{1.73_M2}
GLUCOSE BLD-MCNC: 100 MG/DL (ref 70–99)
HCT VFR BLD CALC: 42.5 % (ref 36–48)
HDLC SERPL-MCNC: 68 MG/DL (ref 40–60)
HEMOGLOBIN: 14.4 G/DL (ref 12–16)
LDL CHOLESTEROL CALCULATED: 176 MG/DL
LYMPHOCYTES ABSOLUTE: 1.2 K/UL (ref 1–5.1)
LYMPHOCYTES RELATIVE PERCENT: 24 %
MCH RBC QN AUTO: 30.2 PG (ref 26–34)
MCHC RBC AUTO-ENTMCNC: 33.8 G/DL (ref 31–36)
MCV RBC AUTO: 89.4 FL (ref 80–100)
MONOCYTES ABSOLUTE: 0.5 K/UL (ref 0–1.3)
MONOCYTES RELATIVE PERCENT: 9.5 %
NEUTROPHILS ABSOLUTE: 3.2 K/UL (ref 1.7–7.7)
NEUTROPHILS RELATIVE PERCENT: 62.8 %
PDW BLD-RTO: 13.1 % (ref 12.4–15.4)
PLATELET # BLD: 242 K/UL (ref 135–450)
PMV BLD AUTO: 9 FL (ref 5–10.5)
POTASSIUM SERPL-SCNC: 4.5 MMOL/L (ref 3.5–5.1)
RBC # BLD: 4.76 M/UL (ref 4–5.2)
SODIUM BLD-SCNC: 141 MMOL/L (ref 136–145)
TOTAL PROTEIN: 6.7 G/DL (ref 6.4–8.2)
TRIGL SERPL-MCNC: 94 MG/DL (ref 0–150)
VLDLC SERPL CALC-MCNC: 19 MG/DL
WBC # BLD: 5.1 K/UL (ref 4–11)

## 2023-03-07 PROCEDURE — 99214 OFFICE O/P EST MOD 30 MIN: CPT | Performed by: FAMILY MEDICINE

## 2023-03-07 PROCEDURE — 3078F DIAST BP <80 MM HG: CPT | Performed by: FAMILY MEDICINE

## 2023-03-07 PROCEDURE — 3074F SYST BP LT 130 MM HG: CPT | Performed by: FAMILY MEDICINE

## 2023-03-07 RX ORDER — FAMOTIDINE 20 MG/1
TABLET, FILM COATED ORAL
Qty: 60 TABLET | Refills: 1 | Status: SHIPPED | OUTPATIENT
Start: 2023-03-07

## 2023-03-07 RX ORDER — DULOXETIN HYDROCHLORIDE 30 MG/1
CAPSULE, DELAYED RELEASE ORAL
Qty: 90 CAPSULE | Refills: 1 | Status: SHIPPED | OUTPATIENT
Start: 2023-03-07

## 2023-03-07 RX ORDER — FOSINOPRIL SODIUM 20 MG/1
TABLET ORAL
Qty: 90 TABLET | Refills: 1 | Status: SHIPPED | OUTPATIENT
Start: 2023-03-07

## 2023-03-07 RX ORDER — HYDROCHLOROTHIAZIDE 25 MG/1
TABLET ORAL
Qty: 90 TABLET | Refills: 1 | Status: SHIPPED | OUTPATIENT
Start: 2023-03-07

## 2023-03-07 SDOH — ECONOMIC STABILITY: FOOD INSECURITY: WITHIN THE PAST 12 MONTHS, YOU WORRIED THAT YOUR FOOD WOULD RUN OUT BEFORE YOU GOT MONEY TO BUY MORE.: NEVER TRUE

## 2023-03-07 SDOH — ECONOMIC STABILITY: INCOME INSECURITY: HOW HARD IS IT FOR YOU TO PAY FOR THE VERY BASICS LIKE FOOD, HOUSING, MEDICAL CARE, AND HEATING?: NOT HARD AT ALL

## 2023-03-07 SDOH — ECONOMIC STABILITY: HOUSING INSECURITY
IN THE LAST 12 MONTHS, WAS THERE A TIME WHEN YOU DID NOT HAVE A STEADY PLACE TO SLEEP OR SLEPT IN A SHELTER (INCLUDING NOW)?: NO

## 2023-03-07 SDOH — ECONOMIC STABILITY: FOOD INSECURITY: WITHIN THE PAST 12 MONTHS, THE FOOD YOU BOUGHT JUST DIDN'T LAST AND YOU DIDN'T HAVE MONEY TO GET MORE.: NEVER TRUE

## 2023-03-07 ASSESSMENT — PATIENT HEALTH QUESTIONNAIRE - PHQ9
3. TROUBLE FALLING OR STAYING ASLEEP: 0
SUM OF ALL RESPONSES TO PHQ QUESTIONS 1-9: 1
10. IF YOU CHECKED OFF ANY PROBLEMS, HOW DIFFICULT HAVE THESE PROBLEMS MADE IT FOR YOU TO DO YOUR WORK, TAKE CARE OF THINGS AT HOME, OR GET ALONG WITH OTHER PEOPLE: 0
2. FEELING DOWN, DEPRESSED OR HOPELESS: 0
SUM OF ALL RESPONSES TO PHQ QUESTIONS 1-9: 1
4. FEELING TIRED OR HAVING LITTLE ENERGY: 0
7. TROUBLE CONCENTRATING ON THINGS, SUCH AS READING THE NEWSPAPER OR WATCHING TELEVISION: 1
1. LITTLE INTEREST OR PLEASURE IN DOING THINGS: 0
5. POOR APPETITE OR OVEREATING: 0
SUM OF ALL RESPONSES TO PHQ9 QUESTIONS 1 & 2: 0
SUM OF ALL RESPONSES TO PHQ QUESTIONS 1-9: 1
8. MOVING OR SPEAKING SO SLOWLY THAT OTHER PEOPLE COULD HAVE NOTICED. OR THE OPPOSITE, BEING SO FIGETY OR RESTLESS THAT YOU HAVE BEEN MOVING AROUND A LOT MORE THAN USUAL: 0
SUM OF ALL RESPONSES TO PHQ QUESTIONS 1-9: 1
9. THOUGHTS THAT YOU WOULD BE BETTER OFF DEAD, OR OF HURTING YOURSELF: 0
6. FEELING BAD ABOUT YOURSELF - OR THAT YOU ARE A FAILURE OR HAVE LET YOURSELF OR YOUR FAMILY DOWN: 0

## 2023-03-07 ASSESSMENT — ENCOUNTER SYMPTOMS
SHORTNESS OF BREATH: 0
BACK PAIN: 0
RHINORRHEA: 1
NAUSEA: 0
ABDOMINAL PAIN: 0
COUGH: 0

## 2023-03-07 NOTE — PROGRESS NOTES
Alexis Parker (:  1960) is a 60 y.o. female,established patient, here for evaluation of the following chief complaint(s):  Follow-up, Hypertension, and Depression         ASSESSMENT/PLAN:  1. Essential hypertension  - hydroCHLOROthiazide (HYDRODIURIL) 25 MG tablet; Take 1 tablet by mouth once daily  Dispense: 90 tablet; Refill: 1  - fosinopril (MONOPRIL) 20 MG tablet; Take 1 tablet by mouth once daily  Dispense: 90 tablet; Refill: 1  - CBC with Auto Differential  - Comprehensive Metabolic Panel    2. Lesion of skin of scalp  - External Referral To Dermatology    3. Gastroesophageal reflux disease without esophagitis  - famotidine (PEPCID) 20 MG tablet; Take 1 tablet by mouth twice daily  Dispense: 60 tablet; Refill: 1    4. Moderate episode of recurrent major depressive disorder (HCC)  - DULoxetine (CYMBALTA) 30 MG extended release capsule; Take 1 capsule by mouth once daily  Dispense: 90 capsule; Refill: 1    5. Screening mammogram for breast cancer  - [facility] SKY DIGITAL SCREEN BILATERAL PER PROTOCOL; Future    6. Mixed hyperlipidemia  - Lipid Panel  The patient is asked to make an attempt to improve diet and exercise patterns     7. Rhinorrhea  She can use OTC Allegra    On this date 3/7/2023 I have spent 30 minutes reviewing previous notes, test results and face to face with the patient discussing the diagnosis and importance of compliance with the treatment plan as well as documenting on the day of the visit. Return for follow up  5-6 months for HTN, HLD.        Lab Results   Component Value Date    TSHFT4 1.42 2021     Lab Results   Component Value Date/Time    COLORU Yellow 2022 04:28 PM    LABPH 5.0 2017 04:05 AM    NITRU Negative 2022 04:28 PM    NITRU NEGATIVE 2013 04:49 AM    GLUCOSEU Negative 2022 04:28 PM    GLUCOSEU negative 2022 04:05 PM    KETUA Negative 2022 04:28 PM    KETUA negative 2022 04:05 PM    UROBILINOGEN 0.2 2022 04:28 PM    BILIRUBINUR Negative 06/02/2022 04:28 PM    BILIRUBINUR negative 06/02/2022 04:05 PM       Subjective   SUBJECTIVE/OBJECTIVE:    HISTORY OF PRESENT ILLNESS:  This is a 60 y.o. female here for the following:  Patient Active Problem List    Diagnosis Date Noted    Cervical spinal stenosis     Gastroesophageal reflux disease without esophagitis 05/26/2021    Hyperlipidemia     Depression     Hypertension     Ataxia 01/02/2019    Right leg weakness 01/02/2019      Anterior cervical discectomy C4-5 and C5-6 9/2022 per Dr. Debi Calle. She is recovering   GERD-on Pepcid 20 mg  HTN-on fosinopril 20 mg and hydrochlorothiazide 25 mg  Depression-on Cymbalta 30  Patient complains of a lesion on her scalp, and would like to have it removed  She complains of some slight clear rhinorrhea and congestion for few days    Review of Systems   Constitutional:  Negative for diaphoresis and fever. HENT:  Positive for congestion and rhinorrhea (clear). Respiratory:  Negative for cough and shortness of breath. Cardiovascular:  Negative for chest pain and palpitations. Gastrointestinal:  Negative for abdominal pain and nausea. Genitourinary:  Negative for difficulty urinating. Musculoskeletal:  Negative for back pain. Neurological:  Negative for dizziness and headaches. No Known Allergies  Current Outpatient Medications   Medication Sig Dispense Refill    hydroCHLOROthiazide (HYDRODIURIL) 25 MG tablet Take 1 tablet by mouth once daily 90 tablet 1    fosinopril (MONOPRIL) 20 MG tablet Take 1 tablet by mouth once daily 90 tablet 1    famotidine (PEPCID) 20 MG tablet Take 1 tablet by mouth twice daily 60 tablet 1    DULoxetine (CYMBALTA) 30 MG extended release capsule Take 1 capsule by mouth once daily 90 capsule 1    medical marijuana Take by mouth as needed. No current facility-administered medications for this visit.           Vitals:    03/07/23 0822   BP: 128/86   Site: Left Upper Arm   Position: Sitting   Cuff Size: Large Adult   Pulse: 55   SpO2: 97%   Weight: 171 lb (77.6 kg)   Height: 5' 7\" (1.702 m)     Objective   Physical Exam  Vitals reviewed. Constitutional:       General: She is not in acute distress. Eyes:      Extraocular Movements: Extraocular movements intact. Cardiovascular:      Rate and Rhythm: Normal rate and regular rhythm. Pulmonary:      Effort: Pulmonary effort is normal. No respiratory distress. Breath sounds: Normal breath sounds. Abdominal:      Palpations: Abdomen is soft. Tenderness: There is no abdominal tenderness. Musculoskeletal:      Cervical back: Neck supple. Right lower leg: No edema. Left lower leg: No edema. Skin:     Findings: Lesion (scalp) present. Neurological:      Mental Status: She is alert and oriented to person, place, and time. Psychiatric:         Mood and Affect: Mood normal.              An electronic signature was used to authenticate this note. --Shasta Gacria DO     This dictation was generated by voice recognition computer software. Although all attempts are made to edit the dictation for accuracy, there may be errors in the transcription that are not intended.

## 2023-03-23 ENCOUNTER — TELEPHONE (OUTPATIENT)
Dept: INTERNAL MEDICINE CLINIC | Age: 63
End: 2023-03-23

## 2023-03-23 DIAGNOSIS — Z13.820 ENCOUNTER FOR OSTEOPOROSIS SCREENING IN ASYMPTOMATIC POSTMENOPAUSAL PATIENT: Primary | ICD-10-CM

## 2023-03-23 DIAGNOSIS — Z78.0 ENCOUNTER FOR OSTEOPOROSIS SCREENING IN ASYMPTOMATIC POSTMENOPAUSAL PATIENT: Primary | ICD-10-CM

## 2023-04-26 ENCOUNTER — TELEPHONE (OUTPATIENT)
Dept: INTERNAL MEDICINE CLINIC | Age: 63
End: 2023-04-26

## 2023-04-26 NOTE — TELEPHONE ENCOUNTER
PROVIDER FEEDBACK LOOP NO SHOW     Patient:Cristela Parker  : 1960  Referring Provider: Driss Zapata  Referral Type: Other     Procedures: [id number] - SURYA SKY DIGITAL SCREEN BILATERAL  Date Service Ordered 3/7/2023        Iraida Black did not show for their scheduled test ordered by your office. Please call/follow up with your patient to explain the significance of the ordered test and direct the patient to call Central Scheduling to schedule the test at their earliest convenience. Please complete one of the following actions from Quick Actions buttons:     Route to Provider:  Route message to ordering provider to seek next steps in care plan. Telephone Encounter:  Telephone encounter will open. Call/follow up with the patient to explain the significance of the ordered test and direct the patient to call Central Scheduling to schedule the test then Document details of call. Open Referral:  Review referral notes or details if needed. Close Referral:  Referral will open. Document in Notes section of referral why the referral is being closed. Examples of referral closure:  Patient had test done outside of of an office in the [address] [place] [address], Patient refuses test, Patient no longer having symptoms, Unable to reach patient. Only close the referral if you are sure the test will not proceed.   Thank you     Pre-Access Scheduling Team

## 2023-04-26 NOTE — TELEPHONE ENCOUNTER
PROVIDER FEEDBACK LOOP NO SHOW     Patient:Cristela Parker  : 1960  Referring Provider: Reid Colbert  Referral Type:  Imaging     Procedures: [id number] - DEXA BONE DENSITY AXIAL SKELETON  Date Service Ordered 3/27/2023        Page Fines did not show for their scheduled test ordered by your office. Please call/follow up with your patient to explain the significance of the ordered test and direct the patient to call Central Scheduling to schedule the test at their earliest convenience. Please complete one of the following actions from Quick Actions buttons:     Route to Provider:  Route message to ordering provider to seek next steps in care plan. Telephone Encounter:  Telephone encounter will open. Call/follow up with the patient to explain the significance of the ordered test and direct the patient to call Central Scheduling to schedule the test then Document details of call. Open Referral:  Review referral notes or details if needed. Close Referral:  Referral will open. Document in Notes section of referral why the referral is being closed. Examples of referral closure:  Patient had test done outside of of an office in the [address] [place] [address], Patient refuses test, Patient no longer having symptoms, Unable to reach patient. Only close the referral if you are sure the test will not proceed.   Thank you     Pre-Access Scheduling Team

## 2023-05-22 DIAGNOSIS — K21.9 GASTROESOPHAGEAL REFLUX DISEASE WITHOUT ESOPHAGITIS: ICD-10-CM

## 2023-05-23 RX ORDER — FAMOTIDINE 20 MG/1
TABLET, FILM COATED ORAL
Qty: 60 TABLET | Refills: 3 | Status: SHIPPED | OUTPATIENT
Start: 2023-05-23

## 2023-09-14 DIAGNOSIS — K21.9 GASTROESOPHAGEAL REFLUX DISEASE WITHOUT ESOPHAGITIS: ICD-10-CM

## 2023-09-14 RX ORDER — FAMOTIDINE 20 MG/1
TABLET, FILM COATED ORAL
Qty: 60 TABLET | Refills: 0 | Status: SHIPPED | OUTPATIENT
Start: 2023-09-14

## 2024-02-01 ENCOUNTER — TELEPHONE (OUTPATIENT)
Dept: INTERNAL MEDICINE CLINIC | Age: 64
End: 2024-02-01

## 2024-02-01 DIAGNOSIS — E78.2 MIXED HYPERLIPIDEMIA: ICD-10-CM

## 2024-02-01 DIAGNOSIS — I10 ESSENTIAL HYPERTENSION: Primary | ICD-10-CM

## 2024-02-01 NOTE — TELEPHONE ENCOUNTER
----- Message from Kristie Meade MA sent at 2/1/2024  8:37 AM EST -----  Subject: Referral Request    Reason for referral request? Patient would like to have orders for labs .   She has an upcoming appt on 2/8 and would like to get done prior. Please   call to inform when ready and to set up a lab appt if need be.   Provider patient wants to be referred to(if known):     Provider Phone Number(if known):    Additional Information for Provider?   ---------------------------------------------------------------------------  --------------  CALL BACK INFO    [phone]; OK to leave message on voicemail  ---------------------------------------------------------------------------  --------------

## 2024-02-05 ENCOUNTER — HOSPITAL ENCOUNTER (OUTPATIENT)
Age: 64
Discharge: HOME | End: 2024-02-05
Payer: COMMERCIAL

## 2024-02-05 DIAGNOSIS — I10 ESSENTIAL HYPERTENSION: ICD-10-CM

## 2024-02-05 DIAGNOSIS — E78.2 MIXED HYPERLIPIDEMIA: ICD-10-CM

## 2024-02-05 LAB
ALBUMIN SERPL-MCNC: 4.6 GM/DL (ref 3.4–5)
ALP BLD-CCNC: 71 IU/L (ref 40–128)
ALT SERPL-CCNC: 14 U/L (ref 10–40)
ANION GAP SERPL CALCULATED.3IONS-SCNC: 10 MMOL/L (ref 7–16)
AST SERPL-CCNC: 15 IU/L (ref 15–37)
BASOPHILS ABSOLUTE: 0.1 K/CU MM
BASOPHILS RELATIVE PERCENT: 0.8 % (ref 0–1)
BILIRUB SERPL-MCNC: 0.5 MG/DL (ref 0–1)
BUN SERPL-MCNC: 17 MG/DL (ref 6–23)
CALCIUM SERPL-MCNC: 9.1 MG/DL (ref 8.3–10.6)
CHLORIDE BLD-SCNC: 104 MMOL/L (ref 99–110)
CHOLEST SERPL-MCNC: 238 MG/DL
CO2: 27 MMOL/L (ref 21–32)
CREAT SERPL-MCNC: 0.7 MG/DL (ref 0.6–1.1)
DIFFERENTIAL TYPE: ABNORMAL
EOSINOPHILS ABSOLUTE: 0.1 K/CU MM
EOSINOPHILS RELATIVE PERCENT: 2.1 % (ref 0–3)
GFR SERPL CREATININE-BSD FRML MDRD: >60 ML/MIN/1.73M2
GLUCOSE SERPL-MCNC: 101 MG/DL (ref 70–99)
HCT VFR BLD CALC: 42.3 % (ref 37–47)
HDLC SERPL-MCNC: 72 MG/DL
HEMOGLOBIN: 13.5 GM/DL (ref 12.5–16)
IMMATURE NEUTROPHIL %: 0.2 % (ref 0–0.43)
LDLC SERPL CALC-MCNC: 149 MG/DL
LYMPHOCYTES ABSOLUTE: 1.4 K/CU MM
LYMPHOCYTES RELATIVE PERCENT: 22.5 % (ref 24–44)
MCH RBC QN AUTO: 30.1 PG (ref 27–31)
MCHC RBC AUTO-ENTMCNC: 31.9 % (ref 32–36)
MCV RBC AUTO: 94.2 FL (ref 78–100)
MONOCYTES ABSOLUTE: 0.6 K/CU MM
MONOCYTES RELATIVE PERCENT: 9.6 % (ref 0–4)
NUCLEATED RBC %: 0 %
PDW BLD-RTO: 11.9 % (ref 11.7–14.9)
PLATELET # BLD: 230 K/CU MM (ref 140–440)
PMV BLD AUTO: 11 FL (ref 7.5–11.1)
POTASSIUM SERPL-SCNC: 4.8 MMOL/L (ref 3.5–5.1)
RBC # BLD: 4.49 M/CU MM (ref 4.2–5.4)
SEGMENTED NEUTROPHILS ABSOLUTE COUNT: 4 K/CU MM
SEGMENTED NEUTROPHILS RELATIVE PERCENT: 64.8 % (ref 36–66)
SODIUM BLD-SCNC: 141 MMOL/L (ref 135–145)
TOTAL IMMATURE NEUTOROPHIL: 0.01 K/CU MM
TOTAL NUCLEATED RBC: 0 K/CU MM
TOTAL PROTEIN: 6.4 GM/DL (ref 6.4–8.2)
TRIGL SERPL-MCNC: 86 MG/DL
WBC # BLD: 6.2 K/CU MM (ref 4–10.5)

## 2024-02-05 PROCEDURE — 80061 LIPID PANEL: CPT

## 2024-02-05 PROCEDURE — 80053 COMPREHEN METABOLIC PANEL: CPT

## 2024-02-05 PROCEDURE — 36415 COLL VENOUS BLD VENIPUNCTURE: CPT

## 2024-02-05 PROCEDURE — 85025 COMPLETE CBC W/AUTO DIFF WBC: CPT

## 2024-02-08 ENCOUNTER — OFFICE VISIT (OUTPATIENT)
Dept: INTERNAL MEDICINE CLINIC | Age: 64
End: 2024-02-08
Payer: COMMERCIAL

## 2024-02-08 VITALS
SYSTOLIC BLOOD PRESSURE: 144 MMHG | OXYGEN SATURATION: 98 % | HEART RATE: 77 BPM | BODY MASS INDEX: 27.12 KG/M2 | WEIGHT: 172.8 LBS | HEIGHT: 67 IN | DIASTOLIC BLOOD PRESSURE: 92 MMHG

## 2024-02-08 DIAGNOSIS — L98.9 SKIN LESION: ICD-10-CM

## 2024-02-08 DIAGNOSIS — I10 ESSENTIAL HYPERTENSION: ICD-10-CM

## 2024-02-08 DIAGNOSIS — E78.2 MIXED HYPERLIPIDEMIA: ICD-10-CM

## 2024-02-08 DIAGNOSIS — R14.0 ABDOMINAL BLOATING: ICD-10-CM

## 2024-02-08 DIAGNOSIS — R11.0 NAUSEA: ICD-10-CM

## 2024-02-08 DIAGNOSIS — R23.2 HOT FLASHES: ICD-10-CM

## 2024-02-08 DIAGNOSIS — K21.9 GASTROESOPHAGEAL REFLUX DISEASE WITHOUT ESOPHAGITIS: ICD-10-CM

## 2024-02-08 DIAGNOSIS — F33.1 MODERATE EPISODE OF RECURRENT MAJOR DEPRESSIVE DISORDER (HCC): Primary | ICD-10-CM

## 2024-02-08 DIAGNOSIS — R73.9 HYPERGLYCEMIA: ICD-10-CM

## 2024-02-08 PROCEDURE — 3077F SYST BP >= 140 MM HG: CPT | Performed by: FAMILY MEDICINE

## 2024-02-08 PROCEDURE — 99214 OFFICE O/P EST MOD 30 MIN: CPT | Performed by: FAMILY MEDICINE

## 2024-02-08 PROCEDURE — 3080F DIAST BP >= 90 MM HG: CPT | Performed by: FAMILY MEDICINE

## 2024-02-08 RX ORDER — DULOXETIN HYDROCHLORIDE 30 MG/1
CAPSULE, DELAYED RELEASE ORAL
Qty: 90 CAPSULE | Refills: 1 | Status: SHIPPED | OUTPATIENT
Start: 2024-02-08

## 2024-02-08 RX ORDER — HYDROCHLOROTHIAZIDE 25 MG/1
TABLET ORAL
Qty: 90 TABLET | Refills: 1 | Status: SHIPPED | OUTPATIENT
Start: 2024-02-08

## 2024-02-08 RX ORDER — FOSINOPRIL SODIUM 20 MG/1
TABLET ORAL
Qty: 90 TABLET | Refills: 1 | Status: SHIPPED | OUTPATIENT
Start: 2024-02-08

## 2024-02-08 RX ORDER — FAMOTIDINE 20 MG/1
20 TABLET, FILM COATED ORAL 2 TIMES DAILY
Qty: 180 TABLET | Refills: 1 | Status: SHIPPED | OUTPATIENT
Start: 2024-02-08

## 2024-02-08 ASSESSMENT — PATIENT HEALTH QUESTIONNAIRE - PHQ9
SUM OF ALL RESPONSES TO PHQ QUESTIONS 1-9: 7
7. TROUBLE CONCENTRATING ON THINGS, SUCH AS READING THE NEWSPAPER OR WATCHING TELEVISION: 1
10. IF YOU CHECKED OFF ANY PROBLEMS, HOW DIFFICULT HAVE THESE PROBLEMS MADE IT FOR YOU TO DO YOUR WORK, TAKE CARE OF THINGS AT HOME, OR GET ALONG WITH OTHER PEOPLE: 1
3. TROUBLE FALLING OR STAYING ASLEEP: 0
1. LITTLE INTEREST OR PLEASURE IN DOING THINGS: 2
2. FEELING DOWN, DEPRESSED OR HOPELESS: 2
9. THOUGHTS THAT YOU WOULD BE BETTER OFF DEAD, OR OF HURTING YOURSELF: 0
SUM OF ALL RESPONSES TO PHQ9 QUESTIONS 1 & 2: 4
4. FEELING TIRED OR HAVING LITTLE ENERGY: 0
SUM OF ALL RESPONSES TO PHQ QUESTIONS 1-9: 7
SUM OF ALL RESPONSES TO PHQ QUESTIONS 1-9: 7
8. MOVING OR SPEAKING SO SLOWLY THAT OTHER PEOPLE COULD HAVE NOTICED. OR THE OPPOSITE, BEING SO FIGETY OR RESTLESS THAT YOU HAVE BEEN MOVING AROUND A LOT MORE THAN USUAL: 0
5. POOR APPETITE OR OVEREATING: 0
SUM OF ALL RESPONSES TO PHQ QUESTIONS 1-9: 7
6. FEELING BAD ABOUT YOURSELF - OR THAT YOU ARE A FAILURE OR HAVE LET YOURSELF OR YOUR FAMILY DOWN: 2

## 2024-02-08 ASSESSMENT — ENCOUNTER SYMPTOMS
COUGH: 0
BACK PAIN: 0
ABDOMINAL PAIN: 0
NAUSEA: 0
SHORTNESS OF BREATH: 0

## 2024-02-08 NOTE — PROGRESS NOTES
Cristela Parker (:  1960) is a 63 y.o. female,established patient, here for evaluation of the following chief complaint(s):  Follow-up (Hot flashes?), Depression, and Hypertension         ASSESSMENT/PLAN:  1. Moderate episode of recurrent major depressive disorder (HCC)  - DULoxetine (CYMBALTA) 30 MG extended release capsule; Take 1 capsule by mouth once daily  Dispense: 90 capsule; Refill: 1  Consider counseling  She may want to increase the Cymbalta to 60 mg    2. Skin lesion, L scalp  - External Referral To Dermatology    3. Gastroesophageal reflux disease without esophagitis  - famotidine (PEPCID) 20 MG tablet; Take 1 tablet by mouth 2 times daily  Dispense: 180 tablet; Refill: 1    4. Essential hypertension  - fosinopril (MONOPRIL) 20 MG tablet; Take 1 tablet by mouth once daily  Dispense: 90 tablet; Refill: 1  - hydroCHLOROthiazide (HYDRODIURIL) 25 MG tablet; Take 1 tablet by mouth once daily  Dispense: 90 tablet; Refill: 1  Monitor BP    5. Nausea- intermittent  She will use the  Pepcid to see if the symptoms improve    6. Abdominal bloating  - Celiac Screen with Reflex (Moe Núñez); Future    7. Mixed hyperlipidemia  - TSH with Reflex; Future    8. Hyperglycemia  - Hemoglobin A1C; Future    9. Hot flashes- intermittent    Await labs  May need to see GI  Persist RTO or call  Return in 3 months (on 2024) for Depression, HTN.       Lab Results   Component Value Date    WBC 6.2 2024    HGB 13.5 2024    HCT 42.3 2024    MCV 94.2 2024     2024     Lab Results   Component Value Date    CHOL 238 (H) 2024     Lab Results   Component Value Date    TRIG 86 2024     Lab Results   Component Value Date    HDL 72 2024     Lab Results   Component Value Date    LDLCALC 149 (H) 2024    LDLDIRECT 204 (H) 2019       Lab Results   Component Value Date     2024    K 4.8 2024     2024    CO2 27 2024    BUN 17

## 2024-02-12 ENCOUNTER — TELEPHONE (OUTPATIENT)
Dept: INTERNAL MEDICINE CLINIC | Age: 64
End: 2024-02-12

## 2024-03-06 ENCOUNTER — PATIENT MESSAGE (OUTPATIENT)
Dept: INTERNAL MEDICINE CLINIC | Age: 64
End: 2024-03-06

## 2024-03-06 RX ORDER — DULOXETIN HYDROCHLORIDE 60 MG/1
60 CAPSULE, DELAYED RELEASE ORAL DAILY
Qty: 90 CAPSULE | Refills: 1 | Status: SHIPPED | OUTPATIENT
Start: 2024-03-06

## 2024-05-08 ENCOUNTER — OFFICE VISIT (OUTPATIENT)
Dept: INTERNAL MEDICINE CLINIC | Age: 64
End: 2024-05-08
Payer: COMMERCIAL

## 2024-05-08 VITALS
BODY MASS INDEX: 26.94 KG/M2 | SYSTOLIC BLOOD PRESSURE: 124 MMHG | WEIGHT: 172 LBS | HEART RATE: 64 BPM | DIASTOLIC BLOOD PRESSURE: 82 MMHG | OXYGEN SATURATION: 94 %

## 2024-05-08 DIAGNOSIS — Z13.820 ENCOUNTER FOR OSTEOPOROSIS SCREENING IN ASYMPTOMATIC POSTMENOPAUSAL PATIENT: ICD-10-CM

## 2024-05-08 DIAGNOSIS — Z12.31 SCREENING MAMMOGRAM FOR BREAST CANCER: ICD-10-CM
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
## 2024-05-08 DIAGNOSIS — Z78.0 ENCOUNTER FOR OSTEOPOROSIS SCREENING IN ASYMPTOMATIC POSTMENOPAUSAL PATIENT: ICD-10-CM

## 2024-05-08 DIAGNOSIS — F33.1 MODERATE EPISODE OF RECURRENT MAJOR DEPRESSIVE DISORDER (HCC): Primary | ICD-10-CM

## 2024-05-08 PROBLEM — G37.9 DEMYELINATING DISEASE OF CENTRAL NERVOUS SYSTEM (HCC): Status: RESOLVED | Noted: 2024-05-08 | Resolved: 2024-05-08

## 2024-05-08 PROBLEM — G37.9 DEMYELINATING DISEASE OF CENTRAL NERVOUS SYSTEM (HCC): Status: ACTIVE | Noted: 2024-05-08

## 2024-05-08 PROCEDURE — 1123F ACP DISCUSS/DSCN MKR DOCD: CPT | Performed by: FAMILY MEDICINE

## 2024-05-08 PROCEDURE — 3074F SYST BP LT 130 MM HG: CPT | Performed by: FAMILY MEDICINE

## 2024-05-08 PROCEDURE — 99213 OFFICE O/P EST LOW 20 MIN: CPT | Performed by: FAMILY MEDICINE

## 2024-05-08 PROCEDURE — 3079F DIAST BP 80-89 MM HG: CPT | Performed by: FAMILY MEDICINE

## 2024-05-08 SDOH — ECONOMIC STABILITY: FOOD INSECURITY: WITHIN THE PAST 12 MONTHS, THE FOOD YOU BOUGHT JUST DIDN'T LAST AND YOU DIDN'T HAVE MONEY TO GET MORE.: NEVER TRUE

## 2024-05-08 SDOH — ECONOMIC STABILITY: FOOD INSECURITY: WITHIN THE PAST 12 MONTHS, YOU WORRIED THAT YOUR FOOD WOULD RUN OUT BEFORE YOU GOT MONEY TO BUY MORE.: NEVER TRUE

## 2024-05-08 SDOH — ECONOMIC STABILITY: INCOME INSECURITY: HOW HARD IS IT FOR YOU TO PAY FOR THE VERY BASICS LIKE FOOD, HOUSING, MEDICAL CARE, AND HEATING?: NOT HARD AT ALL

## 2024-05-08 ASSESSMENT — ENCOUNTER SYMPTOMS
SHORTNESS OF BREATH: 0
ABDOMINAL PAIN: 0
NAUSEA: 0
COUGH: 0

## 2024-08-07 DIAGNOSIS — K21.9 GASTROESOPHAGEAL REFLUX DISEASE WITHOUT ESOPHAGITIS: ICD-10-CM

## 2024-08-07 DIAGNOSIS — I10 ESSENTIAL HYPERTENSION: ICD-10-CM

## 2024-08-07 RX ORDER — HYDROCHLOROTHIAZIDE 25 MG/1
TABLET ORAL
Qty: 90 TABLET | Refills: 0 | Status: SHIPPED | OUTPATIENT
Start: 2024-08-07

## 2024-08-07 RX ORDER — FAMOTIDINE 20 MG/1
20 TABLET, FILM COATED ORAL 2 TIMES DAILY
Qty: 180 TABLET | Refills: 0 | Status: SHIPPED | OUTPATIENT
Start: 2024-08-07

## 2024-08-07 RX ORDER — FOSINOPRIL SODIUM 20 MG/1
TABLET ORAL
Qty: 90 TABLET | Refills: 0 | Status: SHIPPED | OUTPATIENT
Start: 2024-08-07

## 2024-08-13 DIAGNOSIS — Z13.820 ENCOUNTER FOR OSTEOPOROSIS SCREENING IN ASYMPTOMATIC POSTMENOPAUSAL PATIENT: Primary | ICD-10-CM

## 2024-08-13 DIAGNOSIS — Z12.31 SCREENING MAMMOGRAM FOR BREAST CANCER: ICD-10-CM

## 2024-08-13 DIAGNOSIS — Z78.0 ENCOUNTER FOR OSTEOPOROSIS SCREENING IN ASYMPTOMATIC POSTMENOPAUSAL PATIENT: Primary | ICD-10-CM

## 2024-08-14 ENCOUNTER — HOSPITAL ENCOUNTER (OUTPATIENT)
Age: 64
Discharge: HOME | End: 2024-08-14
Payer: COMMERCIAL

## 2024-08-14 ENCOUNTER — HOSPITAL ENCOUNTER (OUTPATIENT)
Dept: WOMENS IMAGING | Age: 64
Discharge: HOME | End: 2024-08-14
Payer: COMMERCIAL

## 2024-08-14 DIAGNOSIS — E78.2 MIXED HYPERLIPIDEMIA: ICD-10-CM

## 2024-08-14 DIAGNOSIS — Z78.0 ENCOUNTER FOR OSTEOPOROSIS SCREENING IN ASYMPTOMATIC POSTMENOPAUSAL PATIENT: ICD-10-CM

## 2024-08-14 DIAGNOSIS — R14.0 ABDOMINAL BLOATING: ICD-10-CM

## 2024-08-14 DIAGNOSIS — Z12.31 SCREENING MAMMOGRAM FOR BREAST CANCER: ICD-10-CM

## 2024-08-14 DIAGNOSIS — Z13.820 ENCOUNTER FOR OSTEOPOROSIS SCREENING IN ASYMPTOMATIC POSTMENOPAUSAL PATIENT: ICD-10-CM

## 2024-08-14 DIAGNOSIS — R73.9 HYPERGLYCEMIA: ICD-10-CM

## 2024-08-14 LAB
ESTIMATED AVERAGE GLUCOSE: 111 MG/DL
HBA1C MFR BLD: 5.5 % (ref 4.2–6.3)
TSH SERPL DL<=0.005 MIU/L-ACNC: 1.71 UIU/ML (ref 0.27–4.2)

## 2024-08-14 PROCEDURE — 83036 HEMOGLOBIN GLYCOSYLATED A1C: CPT

## 2024-08-14 PROCEDURE — 81383 HLA II TYPING 1 ALLELE HR: CPT

## 2024-08-14 PROCEDURE — 77067 SCR MAMMO BI INCL CAD: CPT

## 2024-08-14 PROCEDURE — 84443 ASSAY THYROID STIM HORMONE: CPT

## 2024-08-14 PROCEDURE — 81376 HLA II TYPING 1 LOCUS LR: CPT

## 2024-08-14 PROCEDURE — 36415 COLL VENOUS BLD VENIPUNCTURE: CPT

## 2024-08-14 PROCEDURE — 77080 DXA BONE DENSITY AXIAL: CPT

## 2024-08-20 DIAGNOSIS — I10 ESSENTIAL HYPERTENSION: ICD-10-CM

## 2024-08-20 DIAGNOSIS — K21.9 GASTROESOPHAGEAL REFLUX DISEASE WITHOUT ESOPHAGITIS: ICD-10-CM

## 2024-08-20 RX ORDER — DULOXETIN HYDROCHLORIDE 60 MG/1
60 CAPSULE, DELAYED RELEASE ORAL DAILY
Qty: 90 CAPSULE | Refills: 1 | Status: SHIPPED | OUTPATIENT
Start: 2024-08-20

## 2024-08-20 RX ORDER — FAMOTIDINE 20 MG/1
20 TABLET, FILM COATED ORAL 2 TIMES DAILY
Qty: 180 TABLET | Refills: 0 | Status: SHIPPED | OUTPATIENT
Start: 2024-08-20

## 2024-08-20 RX ORDER — HYDROCHLOROTHIAZIDE 25 MG/1
TABLET ORAL
Qty: 90 TABLET | Refills: 0 | Status: SHIPPED | OUTPATIENT
Start: 2024-08-20

## 2024-08-20 RX ORDER — FOSINOPRIL SODIUM 20 MG/1
TABLET ORAL
Qty: 90 TABLET | Refills: 0 | Status: SHIPPED | OUTPATIENT
Start: 2024-08-20

## 2024-08-22 ENCOUNTER — OFFICE VISIT (OUTPATIENT)
Dept: INTERNAL MEDICINE CLINIC | Age: 64
End: 2024-08-22
Payer: COMMERCIAL

## 2024-08-22 ENCOUNTER — HOSPITAL ENCOUNTER (OUTPATIENT)
Dept: CT IMAGING | Age: 64
Discharge: HOME | End: 2024-08-22
Payer: COMMERCIAL

## 2024-08-22 VITALS
WEIGHT: 171 LBS | OXYGEN SATURATION: 97 % | HEIGHT: 67 IN | DIASTOLIC BLOOD PRESSURE: 80 MMHG | BODY MASS INDEX: 26.84 KG/M2 | SYSTOLIC BLOOD PRESSURE: 136 MMHG | HEART RATE: 73 BPM

## 2024-08-22 DIAGNOSIS — R10.9 FLANK PAIN: ICD-10-CM

## 2024-08-22 DIAGNOSIS — R39.9 UTI SYMPTOMS: ICD-10-CM

## 2024-08-22 DIAGNOSIS — R31.29 OTHER MICROSCOPIC HEMATURIA: ICD-10-CM

## 2024-08-22 DIAGNOSIS — R10.9 FLANK PAIN: Primary | ICD-10-CM

## 2024-08-22 LAB
BILIRUBIN, POC: NORMAL
BLOOD URINE, POC: NORMAL
CLARITY, POC: CLEAR
COLOR, POC: YELLOW
GLUCOSE URINE, POC: NORMAL
KETONES, POC: NORMAL
LEUKOCYTE EST, POC: NORMAL
NITRITE, POC: NORMAL
PH, POC: 5.5
PROTEIN, POC: NORMAL
SPECIFIC GRAVITY, POC: >=1.03
UROBILINOGEN, POC: 0.2

## 2024-08-22 PROCEDURE — 3075F SYST BP GE 130 - 139MM HG: CPT | Performed by: FAMILY MEDICINE

## 2024-08-22 PROCEDURE — 99214 OFFICE O/P EST MOD 30 MIN: CPT | Performed by: FAMILY MEDICINE

## 2024-08-22 PROCEDURE — 81002 URINALYSIS NONAUTO W/O SCOPE: CPT | Performed by: FAMILY MEDICINE

## 2024-08-22 PROCEDURE — 3079F DIAST BP 80-89 MM HG: CPT | Performed by: FAMILY MEDICINE

## 2024-08-22 PROCEDURE — 1123F ACP DISCUSS/DSCN MKR DOCD: CPT | Performed by: FAMILY MEDICINE

## 2024-08-22 PROCEDURE — 74176 CT ABD & PELVIS W/O CONTRAST: CPT

## 2024-08-22 ASSESSMENT — ENCOUNTER SYMPTOMS
NAUSEA: 0
SHORTNESS OF BREATH: 0
COUGH: 0
ABDOMINAL PAIN: 0

## 2024-08-22 NOTE — PROGRESS NOTES
Cristela Parker (:  1960) is a 64 y.o. female,established patient, here for evaluation of the following chief complaint(s):  Flank Pain (Right side flank pain from Monday to yesterday but today it has improved slightly, but still present)      Assessment & Plan   ASSESSMENT/PLAN:  1. Flank pain  - POCT Urinalysis no Micro  - Culture, Urine  - CT ABDOMEN PELVIS WO CONTRAST Additional Contrast? None; Future    2. Other microscopic hematuria  - CT ABDOMEN PELVIS WO CONTRAST Additional Contrast? None; Future    3. UTI symptoms  - POCT Urinalysis no Micro  - Culture, Urine    On this date 2024 I have spent 30 minutes reviewing previous notes, test results and face to face with the patient discussing the diagnosis and importance of compliance with the treatment plan as well as documenting on the day of the visit.    Return if symptoms worsen or fail to improve.       Lab Results   Component Value Date    WBC 6.2 2024    HGB 13.5 2024    HCT 42.3 2024    MCV 94.2 2024     2024     Lab Results   Component Value Date    CHOL 238 (H) 2024     Lab Results   Component Value Date    TRIG 86 2024     Lab Results   Component Value Date    HDL 72 2024     Lab Results   Component Value Date    LDLDIRECT 204 (H) 2019     Lab Results   Component Value Date    LABA1C 5.5 2024     Lab Results   Component Value Date     2024     Lab Results   Component Value Date     2024    K 4.8 2024     2024    CO2 27 2024    BUN 17 2024    CREATININE 0.7 2024    GLUCOSE 101 (H) 2024    CALCIUM 9.1 2024    BILITOT 0.5 2024    ALKPHOS 71 2024    AST 15 2024    ALT 14 2024    LABGLOM >60 2024    GFRAA >60 2022    AGRATIO 2.0 2023    GLOB 2.5 2021     Lab Results   Component Value Date    TSHFT4 1.42 2021     Lab Results   Component Value Date/Time

## 2024-08-23 DIAGNOSIS — N28.89 RENAL MASS: Primary | ICD-10-CM

## 2024-08-23 LAB — BACTERIA UR CULT: NORMAL

## 2024-08-23 ASSESSMENT — ENCOUNTER SYMPTOMS
CONSTIPATION: 0
DIARRHEA: 0

## 2024-09-09 ENCOUNTER — TELEPHONE (OUTPATIENT)
Dept: INTERNAL MEDICINE CLINIC | Age: 64
End: 2024-09-09

## 2024-09-09 DIAGNOSIS — I10 ESSENTIAL HYPERTENSION: Primary | ICD-10-CM

## 2024-09-11 ENCOUNTER — HOSPITAL ENCOUNTER (OUTPATIENT)
Dept: WOMENS IMAGING | Age: 64
Discharge: HOME | End: 2024-09-11
Payer: COMMERCIAL

## 2024-09-11 DIAGNOSIS — R92.8 ABNORMAL MAMMOGRAM: ICD-10-CM

## 2024-09-11 PROCEDURE — G0279 TOMOSYNTHESIS, MAMMO: HCPCS

## 2024-09-12 DIAGNOSIS — R92.1 BREAST CALCIFICATION, LEFT: Primary | ICD-10-CM

## 2024-10-11 ENCOUNTER — HOSPITAL ENCOUNTER (OUTPATIENT)
Dept: CT IMAGING | Age: 64
Discharge: HOME | End: 2024-10-11
Payer: COMMERCIAL

## 2024-10-11 DIAGNOSIS — N28.89 RENAL MASS: ICD-10-CM

## 2024-10-11 LAB
EGFR, POC: >90 ML/MIN/1.73M2
POC CREATININE: 0.7 MG/DL (ref 0.5–1.2)

## 2024-10-11 PROCEDURE — 74170 CT ABD WO CNTRST FLWD CNTRST: CPT | Performed by: FAMILY MEDICINE

## 2024-10-11 PROCEDURE — 6360000004 HC RX CONTRAST MEDICATION: Performed by: FAMILY MEDICINE

## 2024-10-11 PROCEDURE — 2580000003 HC RX 258: Performed by: FAMILY MEDICINE

## 2024-10-11 PROCEDURE — 82565 ASSAY OF CREATININE: CPT

## 2024-10-11 RX ORDER — SODIUM CHLORIDE 9 MG/ML
10 INJECTION, SOLUTION INTRAMUSCULAR; INTRAVENOUS; SUBCUTANEOUS PRN
Status: DISCONTINUED | OUTPATIENT
Start: 2024-10-11 | End: 2024-10-12 | Stop reason: HOSPADM

## 2024-10-11 RX ORDER — IOPAMIDOL 755 MG/ML
75 INJECTION, SOLUTION INTRAVASCULAR
Status: COMPLETED | OUTPATIENT
Start: 2024-10-11 | End: 2024-10-11

## 2024-10-11 RX ADMIN — SODIUM CHLORIDE, PRESERVATIVE FREE 10 ML: 5 INJECTION INTRAVENOUS at 09:41

## 2024-10-11 RX ADMIN — IOPAMIDOL 75 ML: 755 INJECTION, SOLUTION INTRAVENOUS at 09:45

## 2024-10-15 ENCOUNTER — OFFICE VISIT (OUTPATIENT)
Dept: INTERNAL MEDICINE CLINIC | Age: 64
End: 2024-10-15
Payer: COMMERCIAL

## 2024-10-15 VITALS
HEIGHT: 67 IN | DIASTOLIC BLOOD PRESSURE: 78 MMHG | HEART RATE: 77 BPM | OXYGEN SATURATION: 99 % | WEIGHT: 179.6 LBS | SYSTOLIC BLOOD PRESSURE: 116 MMHG | BODY MASS INDEX: 28.19 KG/M2

## 2024-10-15 DIAGNOSIS — Z23 INFLUENZA VACCINE NEEDED: ICD-10-CM

## 2024-10-15 DIAGNOSIS — E78.00 PURE HYPERCHOLESTEROLEMIA: ICD-10-CM

## 2024-10-15 DIAGNOSIS — R92.8 ABNORMAL MAMMOGRAM: ICD-10-CM

## 2024-10-15 DIAGNOSIS — F33.1 MODERATE EPISODE OF RECURRENT MAJOR DEPRESSIVE DISORDER (HCC): ICD-10-CM

## 2024-10-15 DIAGNOSIS — I10 ESSENTIAL HYPERTENSION: Primary | ICD-10-CM

## 2024-10-15 DIAGNOSIS — N28.9 RENAL LESION: ICD-10-CM

## 2024-10-15 DIAGNOSIS — K21.9 GASTROESOPHAGEAL REFLUX DISEASE WITHOUT ESOPHAGITIS: ICD-10-CM

## 2024-10-15 PROCEDURE — 1123F ACP DISCUSS/DSCN MKR DOCD: CPT | Performed by: FAMILY MEDICINE

## 2024-10-15 PROCEDURE — 99214 OFFICE O/P EST MOD 30 MIN: CPT | Performed by: FAMILY MEDICINE

## 2024-10-15 PROCEDURE — 90653 IIV ADJUVANT VACCINE IM: CPT | Performed by: FAMILY MEDICINE

## 2024-10-15 PROCEDURE — 90471 IMMUNIZATION ADMIN: CPT | Performed by: FAMILY MEDICINE

## 2024-10-15 PROCEDURE — 3078F DIAST BP <80 MM HG: CPT | Performed by: FAMILY MEDICINE

## 2024-10-15 PROCEDURE — 3074F SYST BP LT 130 MM HG: CPT | Performed by: FAMILY MEDICINE

## 2024-10-15 ASSESSMENT — ENCOUNTER SYMPTOMS
SHORTNESS OF BREATH: 0
NAUSEA: 0
COUGH: 0
ABDOMINAL PAIN: 0

## 2024-10-15 NOTE — PROGRESS NOTES
Gave flu shot in R deltoid-pt tolerated well-Corina CASTRO [facility]    Vaccine Information Sheet, \"Influenza - Inactivated\"  given to Cristela Parker, or parent/legal guardian of  Cristela Parker and verbalized understanding.    Patient responses:    Have you ever had a reaction to a flu vaccine? No  Do you have any current illness?  No  Have you ever had [place] Syndrome?  No  Do you have a serious allergy to any of the follow: Neomycin, Polymyxin, Thimerosal, eggs or egg products? No    Flu vaccine given per order. Please see immunization tab.    Risks and benefits explained.  Current VIS given.

## 2024-10-15 NOTE — PROGRESS NOTES
Cristela Parker (:  1960) is a 64 y.o. female,established patient, here for evaluation of the following chief complaint(s):  6 Month Follow-Up (Results from MRI)      Assessment & Plan   ASSESSMENT/PLAN:  1. Essential hypertension  Continue fosinopril and HCTZ    2. Gastroesophageal reflux disease without esophagitis  Continue Pepcid    3. Moderate episode of recurrent major depressive disorder (HCC)  Continue Cymbalta 60    4. Abnormal mammogram  Patient will obtain repeat mammogram as ordered    5. Renal lesion- R  CT scan results pending    6. Pure hypercholesterolemia  The patient is asked to make an attempt to improve diet and exercise patterns     7. Influenza vaccine needed  - Influenza, FLUAD Trivalent, (age 65 y+), IM, Preservative Free, 0.5mL    Medications reconciled and discussed with the patient  Return for follow up in 5 to 6 months for HTN.       Lab Results   Component Value Date    WBC 6.2 2024    HGB 13.5 2024    HCT 42.3 2024    MCV 94.2 2024     2024     Lab Results   Component Value Date    CHOL 238 (H) 2024     Lab Results   Component Value Date    TRIG 86 2024     Lab Results   Component Value Date    HDL 72 2024     Lab Results   Component Value Date     (H) 2024    LDLDIRECT 204 (H) 2019       Lab Results   Component Value Date    LABA1C 5.5 2024     Lab Results   Component Value Date     2024     Lab Results   Component Value Date     2024    K 4.8 2024     2024    CO2 27 2024    BUN 17 2024    CREATININE 0.7 10/11/2024    GLUCOSE 101 (H) 2024    CALCIUM 9.1 2024    BILITOT 0.5 2024    ALKPHOS 71 2024    AST 15 2024    ALT 14 2024    LABGLOM >60 2024    GFRAA >60 2022    AGRATIO 2.0 2023    GLOB 2.5 2021     Lab Results   Component Value Date    TSHFT4 1.42 2021    TSH 1.710

## 2024-12-02 DIAGNOSIS — I10 ESSENTIAL HYPERTENSION: ICD-10-CM

## 2024-12-02 DIAGNOSIS — K21.9 GASTROESOPHAGEAL REFLUX DISEASE WITHOUT ESOPHAGITIS: ICD-10-CM

## 2024-12-02 RX ORDER — FAMOTIDINE 20 MG/1
20 TABLET, FILM COATED ORAL 2 TIMES DAILY
Qty: 180 TABLET | Refills: 1 | Status: SHIPPED | OUTPATIENT
Start: 2024-12-02

## 2024-12-02 RX ORDER — FOSINOPRIL SODIUM 20 MG/1
TABLET ORAL
Qty: 90 TABLET | Refills: 1 | Status: SHIPPED | OUTPATIENT
Start: 2024-12-02

## 2024-12-02 RX ORDER — HYDROCHLOROTHIAZIDE 25 MG/1
TABLET ORAL
Qty: 90 TABLET | Refills: 1 | Status: SHIPPED | OUTPATIENT
Start: 2024-12-02

## 2024-12-12 LAB
CELIAC (HLA-DQ8): NORMAL
CELIAC (HLA-DQB1*02): NORMAL
CELIAC HLA-DQA1*05: NORMAL
INTERPRETATION: NORMAL
SPECIMEN: NORMAL

## 2025-02-03 ENCOUNTER — HOSPITAL ENCOUNTER (OUTPATIENT)
Age: 65
Discharge: HOME | End: 2025-02-03
Payer: COMMERCIAL

## 2025-02-03 LAB — IGA SERPL-MCNC: 166 MG/DL (ref 70–400)

## 2025-02-03 PROCEDURE — 82784 ASSAY IGA/IGD/IGG/IGM EACH: CPT

## 2025-02-03 PROCEDURE — 83516 IMMUNOASSAY NONANTIBODY: CPT

## 2025-02-04 LAB — TISSUE TRANSGLUTAMINASE AB, IGA: <0.5 U/ML (ref 0–14)

## 2025-03-03 RX ORDER — DULOXETIN HYDROCHLORIDE 60 MG/1
CAPSULE, DELAYED RELEASE ORAL DAILY
Qty: 90 CAPSULE | Refills: 0 | Status: SHIPPED | OUTPATIENT
Start: 2025-03-03

## 2025-04-16 ENCOUNTER — OFFICE VISIT (OUTPATIENT)
Dept: INTERNAL MEDICINE CLINIC | Age: 65
End: 2025-04-16
Payer: COMMERCIAL

## 2025-04-16 ENCOUNTER — PATIENT MESSAGE (OUTPATIENT)
Dept: INTERNAL MEDICINE CLINIC | Age: 65
End: 2025-04-16

## 2025-04-16 VITALS
WEIGHT: 178 LBS | HEART RATE: 80 BPM | SYSTOLIC BLOOD PRESSURE: 122 MMHG | OXYGEN SATURATION: 97 % | DIASTOLIC BLOOD PRESSURE: 68 MMHG | BODY MASS INDEX: 27.88 KG/M2

## 2025-04-16 DIAGNOSIS — F33.1 MODERATE EPISODE OF RECURRENT MAJOR DEPRESSIVE DISORDER (HCC): ICD-10-CM

## 2025-04-16 DIAGNOSIS — D17.71 ANGIOMYOLIPOMA OF KIDNEY: ICD-10-CM

## 2025-04-16 DIAGNOSIS — I10 ESSENTIAL HYPERTENSION: ICD-10-CM

## 2025-04-16 DIAGNOSIS — F41.9 ANXIETY: ICD-10-CM

## 2025-04-16 DIAGNOSIS — J30.89 NON-SEASONAL ALLERGIC RHINITIS, UNSPECIFIED TRIGGER: ICD-10-CM

## 2025-04-16 DIAGNOSIS — R73.01 IFG (IMPAIRED FASTING GLUCOSE): ICD-10-CM

## 2025-04-16 DIAGNOSIS — Z00.00 ANNUAL PHYSICAL EXAM: Primary | ICD-10-CM

## 2025-04-16 DIAGNOSIS — K21.9 GASTROESOPHAGEAL REFLUX DISEASE WITHOUT ESOPHAGITIS: ICD-10-CM

## 2025-04-16 DIAGNOSIS — E78.2 MIXED HYPERLIPIDEMIA: ICD-10-CM

## 2025-04-16 PROCEDURE — 3078F DIAST BP <80 MM HG: CPT | Performed by: FAMILY MEDICINE

## 2025-04-16 PROCEDURE — 3074F SYST BP LT 130 MM HG: CPT | Performed by: FAMILY MEDICINE

## 2025-04-16 PROCEDURE — 99397 PER PM REEVAL EST PAT 65+ YR: CPT | Performed by: FAMILY MEDICINE

## 2025-04-16 RX ORDER — ALBUTEROL SULFATE 90 UG/1
2 INHALANT RESPIRATORY (INHALATION) 4 TIMES DAILY PRN
Qty: 18 G | Refills: 0 | Status: SHIPPED | OUTPATIENT
Start: 2025-04-16

## 2025-04-16 SDOH — ECONOMIC STABILITY: FOOD INSECURITY: WITHIN THE PAST 12 MONTHS, THE FOOD YOU BOUGHT JUST DIDN'T LAST AND YOU DIDN'T HAVE MONEY TO GET MORE.: NEVER TRUE

## 2025-04-16 SDOH — ECONOMIC STABILITY: FOOD INSECURITY: WITHIN THE PAST 12 MONTHS, YOU WORRIED THAT YOUR FOOD WOULD RUN OUT BEFORE YOU GOT MONEY TO BUY MORE.: NEVER TRUE

## 2025-04-16 ASSESSMENT — PATIENT HEALTH QUESTIONNAIRE - PHQ9
10. IF YOU CHECKED OFF ANY PROBLEMS, HOW DIFFICULT HAVE THESE PROBLEMS MADE IT FOR YOU TO DO YOUR WORK, TAKE CARE OF THINGS AT HOME, OR GET ALONG WITH OTHER PEOPLE: NOT DIFFICULT AT ALL
SUM OF ALL RESPONSES TO PHQ QUESTIONS 1-9: 0
7. TROUBLE CONCENTRATING ON THINGS, SUCH AS READING THE NEWSPAPER OR WATCHING TELEVISION: NOT AT ALL
2. FEELING DOWN, DEPRESSED OR HOPELESS: NOT AT ALL
5. POOR APPETITE OR OVEREATING: NOT AT ALL
SUM OF ALL RESPONSES TO PHQ QUESTIONS 1-9: 0
4. FEELING TIRED OR HAVING LITTLE ENERGY: NOT AT ALL
3. TROUBLE FALLING OR STAYING ASLEEP: NOT AT ALL
SUM OF ALL RESPONSES TO PHQ QUESTIONS 1-9: 0
SUM OF ALL RESPONSES TO PHQ QUESTIONS 1-9: 0
9. THOUGHTS THAT YOU WOULD BE BETTER OFF DEAD, OR OF HURTING YOURSELF: NOT AT ALL
6. FEELING BAD ABOUT YOURSELF - OR THAT YOU ARE A FAILURE OR HAVE LET YOURSELF OR YOUR FAMILY DOWN: NOT AT ALL
8. MOVING OR SPEAKING SO SLOWLY THAT OTHER PEOPLE COULD HAVE NOTICED. OR THE OPPOSITE, BEING SO FIGETY OR RESTLESS THAT YOU HAVE BEEN MOVING AROUND A LOT MORE THAN USUAL: NOT AT ALL
1. LITTLE INTEREST OR PLEASURE IN DOING THINGS: NOT AT ALL

## 2025-04-16 ASSESSMENT — ENCOUNTER SYMPTOMS
DIARRHEA: 0
CONSTIPATION: 0
SHORTNESS OF BREATH: 0
NAUSEA: 0
BLOOD IN STOOL: 0
ABDOMINAL PAIN: 0
BACK PAIN: 0

## 2025-04-16 NOTE — PROGRESS NOTES
Cristela Parker (:  1960) is a 64 y.o. female,established patient, here for evaluation of the following chief complaint(s):  Annual Exam (Concerns about anxiety ) and Hypertension      Assessment & Plan   ASSESSMENT/PLAN:    Assessment & Plan      Assessment & Plan  1. Annual physical examination.    2. Essential hypertension.  She will continue her current regimen of hydrochlorothiazide 25 mg and fosinopril 20 mg.   A comprehensive metabolic panel (CMP), complete blood count (CBC), and urinalysis have been ordered.    3. Moderate episode of recurrent major depressive disorder (HCC).  She will continue her current dosage of Cymbalta 60 mg tablet.    4. Gastroesophageal reflux disease without esophagitis.  She will continue her current regimen of Pepcid 20 mg tablet.    5. Nonseasonal allergic rhinitis, unspecified trigger.  She will initiate albuterol HFA, to be inhaled at a dosage of 2 puffs into the lungs four times daily as needed for wheezing. A prescription for 18 g has been provided with no refills.    6. Angiomyolipoma of kidney-stable.    7. Impaired fasting glucose (IFG).  A future order for hemoglobin A1c has been placed.    8. Mixed hyperlipidemia.  A future order for a lipid panel has been placed.    9. Anxiety  Continue Cymbalta 60 mg. She will monitor her symptoms at this time    PROCEDURE  Colonoscopy was performed in .      Medications reconciled and discussed with the patient  Return for follow up 5 1/2 to 6 months for HTN, HLD.       Lab Results   Component Value Date    WBC 6.2 2024    HGB 13.5 2024    HCT 42.3 2024    MCV 94.2 2024     2024     Lab Results   Component Value Date    CHOL 238 (H) 2024     Lab Results   Component Value Date    TRIG 86 2024     Lab Results   Component Value Date    HDL 72 2024     Lab Results   Component Value Date     (H) 2024    LDLDIRECT 204 (H) 2019       Lab Results

## 2025-05-30 DIAGNOSIS — I10 ESSENTIAL HYPERTENSION: ICD-10-CM

## 2025-05-30 RX ORDER — FOSINOPRIL SODIUM 20 MG/1
20 TABLET ORAL DAILY
Qty: 90 TABLET | Refills: 1 | Status: SHIPPED | OUTPATIENT
Start: 2025-05-30

## 2025-05-30 RX ORDER — HYDROCHLOROTHIAZIDE 25 MG/1
25 TABLET ORAL DAILY
Qty: 90 TABLET | Refills: 1 | Status: SHIPPED | OUTPATIENT
Start: 2025-05-30

## 2025-06-02 RX ORDER — DULOXETIN HYDROCHLORIDE 60 MG/1
CAPSULE, DELAYED RELEASE ORAL DAILY
Qty: 90 CAPSULE | Refills: 0 | Status: SHIPPED | OUTPATIENT
Start: 2025-06-02

## 2025-07-20 DIAGNOSIS — I10 ESSENTIAL HYPERTENSION: ICD-10-CM

## 2025-07-21 RX ORDER — FOSINOPRIL SODIUM 20 MG/1
20 TABLET ORAL DAILY
Qty: 90 TABLET | Refills: 0 | Status: SHIPPED | OUTPATIENT
Start: 2025-07-21

## 2025-08-08 RX ORDER — DULOXETIN HYDROCHLORIDE 60 MG/1
CAPSULE, DELAYED RELEASE ORAL DAILY
Qty: 90 CAPSULE | Refills: 0 | Status: SHIPPED | OUTPATIENT
Start: 2025-08-08